# Patient Record
Sex: FEMALE | Race: WHITE | NOT HISPANIC OR LATINO | Employment: FULL TIME
[De-identification: names, ages, dates, MRNs, and addresses within clinical notes are randomized per-mention and may not be internally consistent; named-entity substitution may affect disease eponyms.]

---

## 2017-06-10 ENCOUNTER — HEALTH MAINTENANCE LETTER (OUTPATIENT)
Age: 62
End: 2017-06-10

## 2017-08-12 ENCOUNTER — HEALTH MAINTENANCE LETTER (OUTPATIENT)
Age: 62
End: 2017-08-12

## 2017-09-15 ENCOUNTER — TRANSFERRED RECORDS (OUTPATIENT)
Dept: HEALTH INFORMATION MANAGEMENT | Facility: CLINIC | Age: 62
End: 2017-09-15

## 2017-09-19 ENCOUNTER — OFFICE VISIT (OUTPATIENT)
Dept: INTERNAL MEDICINE | Facility: CLINIC | Age: 62
End: 2017-09-19
Payer: COMMERCIAL

## 2017-09-19 VITALS
SYSTOLIC BLOOD PRESSURE: 122 MMHG | WEIGHT: 247 LBS | DIASTOLIC BLOOD PRESSURE: 80 MMHG | BODY MASS INDEX: 39.7 KG/M2 | OXYGEN SATURATION: 98 % | HEIGHT: 66 IN | TEMPERATURE: 98.1 F | HEART RATE: 65 BPM

## 2017-09-19 DIAGNOSIS — E66.01 MORBID OBESITY, UNSPECIFIED OBESITY TYPE (H): ICD-10-CM

## 2017-09-19 DIAGNOSIS — Z12.39 SCREENING BREAST EXAMINATION: ICD-10-CM

## 2017-09-19 DIAGNOSIS — G47.33 OSA (OBSTRUCTIVE SLEEP APNEA): ICD-10-CM

## 2017-09-19 DIAGNOSIS — Z00.01 ENCOUNTER FOR ROUTINE ADULT MEDICAL EXAM WITH ABNORMAL FINDINGS: Primary | ICD-10-CM

## 2017-09-19 DIAGNOSIS — Z11.59 NEED FOR HEPATITIS C SCREENING TEST: ICD-10-CM

## 2017-09-19 DIAGNOSIS — E11.9 TYPE 2 DIABETES MELLITUS WITHOUT COMPLICATION, WITHOUT LONG-TERM CURRENT USE OF INSULIN (H): ICD-10-CM

## 2017-09-19 DIAGNOSIS — D12.6 ADENOMATOUS POLYP OF COLON, UNSPECIFIED PART OF COLON: ICD-10-CM

## 2017-09-19 DIAGNOSIS — Z23 NEED FOR PROPHYLACTIC VACCINATION AND INOCULATION AGAINST INFLUENZA: ICD-10-CM

## 2017-09-19 DIAGNOSIS — Z13.820 SCREENING FOR OSTEOPOROSIS: ICD-10-CM

## 2017-09-19 LAB
ALBUMIN SERPL-MCNC: 3.5 G/DL (ref 3.4–5)
ALP SERPL-CCNC: 70 U/L (ref 40–150)
ALT SERPL W P-5'-P-CCNC: 50 U/L (ref 0–50)
ANION GAP SERPL CALCULATED.3IONS-SCNC: 8 MMOL/L (ref 3–14)
AST SERPL W P-5'-P-CCNC: 48 U/L (ref 0–45)
BILIRUB SERPL-MCNC: 0.7 MG/DL (ref 0.2–1.3)
BUN SERPL-MCNC: 12 MG/DL (ref 7–30)
CALCIUM SERPL-MCNC: 9.3 MG/DL (ref 8.5–10.1)
CHLORIDE SERPL-SCNC: 102 MMOL/L (ref 94–109)
CHOLEST SERPL-MCNC: 140 MG/DL
CO2 SERPL-SCNC: 28 MMOL/L (ref 20–32)
CREAT SERPL-MCNC: 0.78 MG/DL (ref 0.52–1.04)
CREAT UR-MCNC: 254 MG/DL
GFR SERPL CREATININE-BSD FRML MDRD: 75 ML/MIN/1.7M2
GLUCOSE SERPL-MCNC: 133 MG/DL (ref 70–99)
HBA1C MFR BLD: 6.8 % (ref 4.3–6)
HCV AB SERPL QL IA: NONREACTIVE
HDLC SERPL-MCNC: 50 MG/DL
LDLC SERPL CALC-MCNC: 69 MG/DL
MICROALBUMIN UR-MCNC: 35 MG/L
MICROALBUMIN/CREAT UR: 13.94 MG/G CR (ref 0–25)
NONHDLC SERPL-MCNC: 90 MG/DL
POTASSIUM SERPL-SCNC: 3.7 MMOL/L (ref 3.4–5.3)
PROT SERPL-MCNC: 7.9 G/DL (ref 6.8–8.8)
SODIUM SERPL-SCNC: 138 MMOL/L (ref 133–144)
TRIGL SERPL-MCNC: 105 MG/DL
TSH SERPL DL<=0.005 MIU/L-ACNC: 2.46 MU/L (ref 0.4–4)

## 2017-09-19 PROCEDURE — 82043 UR ALBUMIN QUANTITATIVE: CPT | Performed by: INTERNAL MEDICINE

## 2017-09-19 PROCEDURE — 83036 HEMOGLOBIN GLYCOSYLATED A1C: CPT | Performed by: INTERNAL MEDICINE

## 2017-09-19 PROCEDURE — 84443 ASSAY THYROID STIM HORMONE: CPT | Performed by: INTERNAL MEDICINE

## 2017-09-19 PROCEDURE — 90686 IIV4 VACC NO PRSV 0.5 ML IM: CPT | Performed by: INTERNAL MEDICINE

## 2017-09-19 PROCEDURE — 99396 PREV VISIT EST AGE 40-64: CPT | Performed by: INTERNAL MEDICINE

## 2017-09-19 PROCEDURE — 36415 COLL VENOUS BLD VENIPUNCTURE: CPT | Performed by: INTERNAL MEDICINE

## 2017-09-19 PROCEDURE — 80061 LIPID PANEL: CPT | Performed by: INTERNAL MEDICINE

## 2017-09-19 PROCEDURE — 90471 IMMUNIZATION ADMIN: CPT | Performed by: INTERNAL MEDICINE

## 2017-09-19 PROCEDURE — 99207 C FOOT EXAM  NO CHARGE: CPT | Mod: 25 | Performed by: INTERNAL MEDICINE

## 2017-09-19 PROCEDURE — 80053 COMPREHEN METABOLIC PANEL: CPT | Performed by: INTERNAL MEDICINE

## 2017-09-19 PROCEDURE — 86803 HEPATITIS C AB TEST: CPT | Performed by: INTERNAL MEDICINE

## 2017-09-19 RX ORDER — PRAVASTATIN SODIUM 20 MG
20 TABLET ORAL
COMMUNITY
Start: 2016-08-24 | End: 2017-09-27

## 2017-09-19 RX ORDER — METFORMIN HCL 500 MG
1000 TABLET, EXTENDED RELEASE 24 HR ORAL
COMMUNITY
Start: 2016-08-24 | End: 2017-09-27

## 2017-09-19 ASSESSMENT — PATIENT HEALTH QUESTIONNAIRE - PHQ9: SUM OF ALL RESPONSES TO PHQ QUESTIONS 1-9: 3

## 2017-09-19 NOTE — NURSING NOTE
"Chief Complaint   Patient presents with     Physical       Initial /80  Pulse 65  Temp 98.1  F (36.7  C) (Oral)  Ht 5' 5.5\" (1.664 m)  Wt 247 lb (112 kg)  LMP 10/26/2006  SpO2 98%  BMI 40.48 kg/m2 Estimated body mass index is 40.48 kg/(m^2) as calculated from the following:    Height as of this encounter: 5' 5.5\" (1.664 m).    Weight as of this encounter: 247 lb (112 kg).  Medication Reconciliation: complete  "

## 2017-09-19 NOTE — MR AVS SNAPSHOT
After Visit Summary   9/19/2017    Kristen Moser    MRN: [id number]           Patient Information     Date Of Birth          1955        Visit Information        Provider Department      9/19/2017 8:00 AM Radames Reed [place] [facility]        Today's Diagnoses     Need for prophylactic vaccination and inoculation against influenza    -  1    Type 2 diabetes mellitus without complication, without long-term current use of insulin (H)        Adenomatous polyp of colon, unspecified part of colon        Screening breast examination        History of menopause        Need for hepatitis C screening test          Care Instructions    Continue current meds  Prescriptions refilled after labs back.    Labs today as ordered  Calorie/carbohydrate (sugar, bread, potato, pasta, etc) reduction in diet for weight loss.  Increase color on your plate with fruits and vegetables. Increase  frequency of walking or other aerobic exercise as able (goal is daily)  Bone density test  And Mammogram - [facility]  Vaccinations: Influenza  Colonoscopy  with  [facility]. They will call to schedule. If not heard from them in 1 week, then call [phone].   Pt to check with insurance re: coverage for Zostavax (Shingles vaccine) and  get through the pharmacy or clinic (depending on your insurance's preference) if you wish to receive it          Follow-ups after your visit        Additional Services     GASTROENTEROLOGY ADULT REF PROCEDURE ONLY                 Future tests that were ordered for you today     Open Future Orders        Priority Expected Expires Ordered    DX Hip/Pelvis/Spine Routine  9/19/2018 9/19/2017    *MA Screening Digital Bilateral Routine  9/19/2018 9/19/2017            Who to contact     If you have questions or need follow up information about today's clinic visit or your schedule please contact [facility] directly at [phone].  Normal or  "non-critical lab and imaging results will be communicated to you by MyChart, letter or phone within 4 business days after the clinic has received the results. If you do not hear from us within 7 days, please contact the clinic through [employer] or phone. If you have a critical or abnormal lab result, we will notify you by phone as soon as possible.  Submit refill requests through [employer] or call your pharmacy and they will forward the refill request to us. Please allow 3 business days for your refill to be completed.          Additional Information About Your Visit        [employer] Information     [employer] lets you send messages to your doctor, view your test results, renew your prescriptions, schedule appointments and more. To sign up, go to [email or web address] . Click on \"Log in\" on the left side of the screen, which will take you to the Welcome page. Then click on \"Sign up Now\" on the right side of the page.     You will be asked to enter the access code listed below, as well as some personal information. Please follow the directions to create your username and password.     Your access code is: [email or web address]  Expires: 2017  8:44 AM     Your access code will  in 90 days. If you need help or a new code, please call your [place] clinic or [phone].        Care EveryWhere ID     This is your Care EveryWhere ID. This could be used by other organizations to access your [place] medical records  [id number]        Your Vitals Were     Pulse Temperature Height Last Period Pulse Oximetry BMI (Body Mass Index)    65 98.1  F (36.7  C) (Oral) 5' 5.5\" (1.664 m) 10/26/2006 98% 40.48 kg/m2       Blood Pressure from Last 3 Encounters:   17 122/80   06/08/15 122/82   05/20/15 138/61    Weight from Last 3 Encounters:   17 247 lb (112 kg)   06/08/15 244 lb (110.7 kg)   05/20/15 240 lb (108.9 kg)              We Performed the Following     Albumin Random Urine Quantitative with Creat Ratio     " Comprehensive metabolic panel     EYE EXAM (SIMPLE-NONBILLABLE)     FLU VAC, SPLIT VIRUS IM > 3 YO (QUADRIVALENT) [81619]     FOOT EXAM     GASTROENTEROLOGY ADULT REF PROCEDURE ONLY     Hemoglobin A1c     Hepatitis C Screen Reflex to HCV RNA Quant and Genotype     Lipid panel reflex to direct LDL     TSH with free T4 reflex     Vaccine Administration, Initial [13814]        Primary Care Provider Office Phone # Fax #    Radames Reed [phone]       [address] [facility] [address]        Equal Access to Services     SANTOS MOREAU : Hadii aad ku hadasho Soomaali, waaxda luqadaha, qaybta kaalmada adeegyada, waxay idiin hayaan adeeg kharash lanahomy . So [facility] [phone].    ATENCIÓN: Si habla español, tiene a palumbo disposición servicios gratuitos de asistencia lingüística. Llame al [phone].    We comply with applicable federal civil rights laws and [place] laws. We do not discriminate on the basis of race, color, national origin, age, disability sex, sexual orientation or gender identity.            Thank you!     Thank you for choosing [facility]  for your care. Our goal is always to provide you with excellent care. Hearing back from our patients is one way we can continue to improve our services. Please take a few minutes to complete the written survey that you may receive in the mail after your visit with us. Thank you!             Your Updated Medication List - Protect others around you: Learn how to safely use, store and throw away your medicines at www.disposemymeds.org.          This list is accurate as of: 9/19/17  8:45 AM.  Always use your most recent med list.                   Brand Name Dispense Instructions for use Diagnosis    amLODIPine 5 MG tablet    NORVASC    90 tablet    Take 1 tablet (5 mg) by mouth daily    Essential hypertension, benign       aspirin 81 MG tablet     100    1 tab po QD (Once per day)        CALCIUM 600+D 600-200 MG-UNIT Tabs    Generic drug:  calcium carbonate-vitamin D      Take 1 tablet by mouth 2 times daily        ibuprofen 600 MG tablet    ADVIL/MOTRIN    30 tablet    Take 1 tablet by mouth every 6 hours as needed for pain.    Baker's cyst, ruptured       losartan-hydrochlorothiazide 100-25 MG per tablet    HYZAAR    90 tablet    Take 1 tablet by mouth daily    Essential hypertension, benign       metFORMIN 500 MG 24 hr tablet    GLUCOPHAGE-XR     Take 1,000 mg by mouth        pravastatin 20 MG tablet    PRAVACHOL     Take 20 mg by mouth        vitamin D 2000 UNITS tablet     100 tablet    Take 2,000 Units by mouth daily.    Vitamin D deficiency disease

## 2017-09-19 NOTE — PATIENT INSTRUCTIONS
Continue current meds  Prescriptions refilled after labs back.    Labs today as ordered  Calorie/carbohydrate (sugar, bread, potato, pasta, etc) reduction in diet for weight loss.  Increase color on your plate with fruits and vegetables. Increase  frequency of walking or other aerobic exercise as able (goal is daily)  Bone density test  And Mammogram - [place]  Vaccinations: Influenza  Colonoscopy  with  [facility]. They will call to schedule. If not heard from them in 1 week, then call [phone].   Pt to check with insurance re: coverage for Zostavax (Shingles vaccine) and  get through the pharmacy or clinic (depending on your insurance's preference) if you wish to receive it

## 2017-09-19 NOTE — PROGRESS NOTES
SUBJECTIVE:   CC: Kristen Moser is an 62 year old woman who presents for preventive health visit.     Healthy Habits:  Answers for HPI/ROS submitted by the patient on 9/19/2017   Annual Exam:  Getting at least 3 servings of Calcium per day:: NO  Bi-annual eye exam:: Yes  Dental care twice a year:: Yes  Sleep apnea or symptoms of sleep apnea:: Sleep apnea  Diet:: Regular (no restrictions), Vegetarian/vegan  Taking medications regularly:: Yes  Medication side effects:: None  Additional concerns today:: No  PHQ-2 Score: 0       Hx diabetes mellitus. Had labs done 1 year ago at [facility]. See Care Everywhere for results.         Today's PHQ-2 Score: PHQ-2 ( 1999 Pfizer) 9/19/2017 6/8/2015   Q1: Little interest or pleasure in doing things 0 0   Q2: Feeling down, depressed or hopeless 0 0   PHQ-2 Score 0 0   Q1: Little interest or pleasure in doing things Not at all -   Q2: Feeling down, depressed or hopeless Not at all -   PHQ-2 Score 0 -         Abuse: Current or Past(Physical, Sexual or Emotional)- No  Do you feel safe in your environment - Yes  Social History   Substance Use Topics     Smoking status: Never Smoker     Smokeless tobacco: Never Used     Alcohol use No      Comment: 1 drink / month     The patient does not drink >3 drinks per day nor >7 drinks per week.    Reviewed orders with patient.  Reviewed health maintenance and updated orders accordingly - Yes  Labs reviewed in [facility]    Patient over age 50, mutual decision to screen reflected in health maintenance.      Pertinent mammograms are reviewed under the imaging tab.  History of abnormal Pap smear: NO - age 30-65 PAP every 5 years with negative HPV co-testing recommended    Reviewed and updated as needed this visit by clinical staffAllergies         Reviewed and updated as needed this visit by Provider            ROS:  C: NEGATIVE for fever, chills Weight up 3 pounds in 2 years  I: NEGATIVE for worrisome rashes, moles or lesions  E: NEGATIVE for  "vision changes or irritation. Had eye exam last week  ENT: NEGATIVE for ear, mouth and throat problems  R: NEGATIVE for significant cough or SOB  B: NEGATIVE for masses, tenderness or discharge  CV: NEGATIVE for chest pain, palpitations or peripheral edema  GI: NEGATIVE for nausea, abdominal pain, heartburn, or change in bowel habits  : NEGATIVE for unusual urinary or vaginal symptoms. No vaginal bleeding. Pap UTD  M: NEGATIVE for significant arthralgias or myalgia  N: NEGATIVE for weakness, dizziness or paresthesias  P: NEGATIVE for changes in mood or affect. Now off of Prozac for > 1 year. PHQ=3  E: POSITIVE for diabetes mellitus. Not checking sugars due for A1C and other labs. Last done Aug 2016    OBJECTIVE:   /80  Pulse 65  Temp 98.1  F (36.7  C) (Oral)  Ht 5' 5.5\" (1.664 m)  Wt 247 lb (112 kg)  LMP 10/26/2006  SpO2 98%  BMI 40.48 kg/m2  EXAM:  General appearance - healthy, alert, no distress  Skin - No rashes or lesions.  Head - normocephalic, atraumatic  Eyes - TAMIR, EOMI, fundi exam with nondilated pupils negative.  Ears - External ears normal. Canals clear. TM's normal.  Nose/Sinuses - Nares normal. Septum midline. Mucosa normal. No drainage or sinus tenderness.  Oropharynx - No erythema, no adenopathy, no exudates. Narrowed airway due to obesity  Neck - Supple without adenopathy or thyromegaly. No bruits.  Lungs - Clear to auscultation without wheezes/rhonchi.  Heart - Regular rate and rhythm without murmurs, clicks, or gallops.  Nodes - No supraclavicular, axillary, or inguinal adenopathy palpable.  Breasts - deferred  Abdomen - Abdomen obese, soft, non-tender. BS normal. No masses or hepatosplenomegaly palpable. No bruits.  Extremities -No cyanosis, clubbing or edema.    Musculoskeletal - Spine ROM normal. Muscular strength intact.   Peripheral pulses - radial=4/4, femoral=4/4, posterior tibial=4/4, dorsalis pedis=4/4,  Neuro - Gait normal. Reflexes normal and symmetric. Sensation grossly " WNL.  Genital/Rectal - deferred      ASSESSMENT/PLAN:   1. Encounter for routine adult medical exam with abnormal findings  See below for HCM.  Pap UTD  - Comprehensive metabolic panel    2. Type 2 diabetes mellitus without complication, without long-term current use of insulin (H)  Previous well controlled. Not checking sugars currently. Labs as ordered  - EYE EXAM (SIMPLE-NONBILLABLE)  - Comprehensive metabolic panel  - Lipid panel reflex to direct LDL  - Albumin Random Urine Quantitative with Creat Ratio  - TSH with free T4 reflex  - Hemoglobin A1c  - FOOT EXAM    3. Morbid obesity, unspecified obesity type (H)  See plan discussion below re: counselling    4. Adenomatous polyp of colon, unspecified part of colon  Overdue for colonoscopy with hx polyp  - GASTROENTEROLOGY ADULT REF PROCEDURE ONLY    5. DIXIE (obstructive sleep apnea)  Pt declines CPAp at this time. Energy moderate. Pt will inform MD if willing to start CPAP therapy and will then refer to sleep clinic again    6. Screening breast examination  - *MA Screening Digital Bilateral; Future    7. Need for prophylactic vaccination and inoculation against influenza  - FLU VAC, SPLIT VIRUS IM > 3 YO (QUADRIVALENT) [80732]  - Vaccine Administration, Initial [12250]    8. Need for hepatitis C screening test  Pt meets criteria for hepatitis C screening based on birth year 1946-65. Discussed pro/cons of Hep C screening/treatment and recs of USPTF. Pt agreeable to screening  - Hepatitis C Screen Reflex to HCV RNA Quant and Genotype    9. Screening for osteoporosis  Due for DEXA  - DX Hip/Pelvis/Spine; Future      COUNSELING:   Reviewed preventive health counseling, as reflected in patient instructions  Special attention given to:        Regular exercise       Healthy diet/nutrition         reports that she has never smoked. She has never used smokeless tobacco.    Estimated body mass index is 40.6 kg/(m^2) as calculated from the following:    Height as of 5/20/15:  "5' 5\" (1.651 m).    Weight as of 6/8/15: 244 lb (110.7 kg).   Weight management plan: Discussed healthy diet and exercise guidelines and patient will follow up in 6 months in clinic to re-evaluate.    Counseling Resources:  ATP IV Guidelines  Pooled Cohorts Equation Calculator  Breast Cancer Risk Calculator  FRAX Risk Assessment  ICSI Preventive Guidelines  Dietary Guidelines for Americans, 2010  USDA's MyPlate  ASA Prophylaxis  Lung CA Screening      PLAN:  Continue current meds  Prescriptions refilled after labs back.    Labs today as ordered  Calorie/carbohydrate (sugar, bread, potato, pasta, etc) reduction in diet for weight loss.  Increase color on your plate with fruits and vegetables. Increase  frequency of walking or other aerobic exercise as able (goal is daily)  Bone density test  And [facility] - [facility]  Vaccinations: Influenza  Colonoscopy  with  [facility]. They will call to schedule. If not heard from them in 1 week, then call [phone].   Pt to check with insurance re: coverage for Zostavax (Shingles vaccine) and  get through the pharmacy or clinic (depending on your insurance's preference) if you wish to receive it         Radames Reed MD  [facility]      Injectable Influenza Immunization Documentation    1.  Is the person to be vaccinated sick today? NO    2. Does the person to be vaccinated have an allergy to a component   of the vaccine? NO    3. Has the person to be vaccinated ever had a serious reaction   to influenza vaccine in the past? NO    4. Has the person to be vaccinated ever had Guillain-Barré syndrome? NO    Form completed by Susanna Tinsley CMA           "

## 2017-09-19 NOTE — LETTER
[facility]  [address] [address]  [phone]      9/27/2017       Kristen Moser  [address] [facility] [address]        Dear Kristen,  Here are your most recent lab results. Unless commented on below, mild variation of results  outside the normal range are not clinically signicant.    Resulted Orders   Comprehensive metabolic panel   Result Value Ref Range    Sodium 138 133 - 144 mmol/L    Potassium 3.7 3.4 - 5.3 mmol/L    Chloride 102 94 - 109 mmol/L    Carbon Dioxide 28 20 - 32 mmol/L    Anion Gap 8 3 - 14 mmol/L    Glucose 133 (H) 70 - 99 mg/dL    Urea Nitrogen 12 7 - 30 mg/dL    Creatinine 0.78 0.52 - 1.04 mg/dL    GFR Estimate 75 >60 mL/min/1.7m2      Comment:      Non  GFR Calc    GFR Estimate If Black >90 >60 mL/min/1.7m2      Comment:       GFR Calc    Calcium 9.3 8.5 - 10.1 mg/dL    Bilirubin Total 0.7 0.2 - 1.3 mg/dL    Albumin 3.5 3.4 - 5.0 g/dL    Protein Total 7.9 6.8 - 8.8 g/dL    Alkaline Phosphatase 70 40 - 150 U/L    ALT 50 0 - 50 U/L    AST 48 (H) 0 - 45 U/L   Lipid panel reflex to direct LDL   Result Value Ref Range    Cholesterol 140 <200 mg/dL    Triglycerides 105 <150 mg/dL    HDL Cholesterol 50 >49 mg/dL    LDL Cholesterol Calculated 69 <100 mg/dL      Comment:      Desirable:       <100 mg/dl    Non HDL Cholesterol 90 <130 mg/dL   Albumin Random Urine Quantitative with Creat Ratio   Result Value Ref Range    Creatinine Urine 254 mg/dL    Albumin Urine mg/L 35 mg/L    Albumin Urine mg/g Cr 13.94 0 - 25 mg/g Cr   TSH with free T4 reflex   Result Value Ref Range    TSH 2.46 0.40 - 4.00 mU/L   Hemoglobin A1c   Result Value Ref Range    Hemoglobin A1C 6.8 (H) 4.3 - 6.0 %   Hepatitis C Screen Reflex to HCV RNA Quant and Genotype   Result Value Ref Range    Hepatitis C Antibody Nonreactive NR^Nonreactive      Comment:      Assay performance characteristics have not been established for newborns,  "  infants, and children         Total Cholesterol, HDL (good) Cholesterol, LDL (bad) Cholesterol, Non-HDL (bad) cholesterol, Triglycerides, Electrolyte, Glucose/Sugar with history of diabetes, Kidney function, ALT Liver, Thyroid and Urine Protein lab results were normal.  AST Liver lab results were minimally abnormal.  A1C lab result (which assesses your average blood sugar control for the past 3 months) was stable and overall showed good control of your diabetes.  No evidence of hepatitis C infection.  Continue current medication. Medications have been refilled at your pharmacy  Call our appointment desk at [phone] to schedule a \"lab only\" to have your A1C and Comprehensive Metabolic Panel checked non-fasting in 6 months.    If you have further questions/concerns regarding the results, I would ask that you bring them to your next follow-up appointment with me and I would be happy to review them with you further.      Sincerely,      Radames Reed MD  Internal Medicine      "

## 2017-09-19 NOTE — Clinical Note
Please abstract the following data from this visit with this patient into the appropriate field in Epic:  Colonoscopy done on this date: 01/16/2018 (approximately), by this group: [facility], results were Positive for Polyps.  Return in 5 years

## 2017-09-23 ENCOUNTER — HEALTH MAINTENANCE LETTER (OUTPATIENT)
Age: 62
End: 2017-09-23

## 2017-09-27 ENCOUNTER — TELEPHONE (OUTPATIENT)
Dept: INTERNAL MEDICINE | Facility: CLINIC | Age: 62
End: 2017-09-27

## 2017-09-27 DIAGNOSIS — E78.5 HYPERLIPIDEMIA WITH TARGET LDL LESS THAN 100: ICD-10-CM

## 2017-09-27 DIAGNOSIS — E11.9 TYPE 2 DIABETES MELLITUS WITHOUT COMPLICATION, WITHOUT LONG-TERM CURRENT USE OF INSULIN (H): Primary | ICD-10-CM

## 2017-09-27 DIAGNOSIS — I10 ESSENTIAL HYPERTENSION, BENIGN: ICD-10-CM

## 2017-09-27 RX ORDER — AMLODIPINE BESYLATE 5 MG/1
5 TABLET ORAL DAILY
Qty: 90 TABLET | Refills: 3 | Status: SHIPPED | OUTPATIENT
Start: 2017-09-27 | End: 2018-04-18

## 2017-09-27 RX ORDER — PRAVASTATIN SODIUM 20 MG
20 TABLET ORAL DAILY
Qty: 90 TABLET | Refills: 3 | Status: SHIPPED | OUTPATIENT
Start: 2017-09-27 | End: 2018-04-18

## 2017-09-27 RX ORDER — LOSARTAN POTASSIUM AND HYDROCHLOROTHIAZIDE 25; 100 MG/1; MG/1
1 TABLET ORAL DAILY
Qty: 90 TABLET | Refills: 3 | Status: SHIPPED | OUTPATIENT
Start: 2017-09-27 | End: 2018-04-18

## 2017-09-27 RX ORDER — METFORMIN HCL 500 MG
1000 TABLET, EXTENDED RELEASE 24 HR ORAL DAILY
Qty: 180 TABLET | Refills: 3 | Status: SHIPPED | OUTPATIENT
Start: 2017-09-27 | End: 2018-04-18

## 2017-09-27 NOTE — TELEPHONE ENCOUNTER
Patient stated that from last visit you wanted to wait until her labs were back to send over her refill. She says she only has one pill remaining and needs rx sent over asap. Please call patient @ mobile # listed on file when rx is approved.

## 2017-09-27 NOTE — TELEPHONE ENCOUNTER
Rxs refilled. Tried to call pt at work number but not able to reach her. LM with home number that RFs done. WIll have letter sent to pt with labs

## 2018-01-15 ENCOUNTER — TRANSFERRED RECORDS (OUTPATIENT)
Dept: HEALTH INFORMATION MANAGEMENT | Facility: CLINIC | Age: 63
End: 2018-01-15

## 2018-01-16 ENCOUNTER — EXTERNAL ORDER RESULTS (OUTPATIENT)
Dept: HEALTH INFORMATION MANAGEMENT | Facility: CLINIC | Age: 63
End: 2018-01-16

## 2018-01-24 PROBLEM — K63.5 COLON POLYP: Status: ACTIVE | Noted: 2018-01-24

## 2018-04-18 DIAGNOSIS — E11.9 TYPE 2 DIABETES MELLITUS WITHOUT COMPLICATION, WITHOUT LONG-TERM CURRENT USE OF INSULIN (H): ICD-10-CM

## 2018-04-18 DIAGNOSIS — I10 ESSENTIAL HYPERTENSION, BENIGN: ICD-10-CM

## 2018-04-18 DIAGNOSIS — E55.9 VITAMIN D DEFICIENCY DISEASE: ICD-10-CM

## 2018-04-18 DIAGNOSIS — E78.5 HYPERLIPIDEMIA WITH TARGET LDL LESS THAN 100: ICD-10-CM

## 2018-04-18 RX ORDER — METFORMIN HCL 500 MG
1000 TABLET, EXTENDED RELEASE 24 HR ORAL DAILY
Qty: 60 TABLET | Refills: 0 | Status: SHIPPED | OUTPATIENT
Start: 2018-04-18 | End: 2018-05-17

## 2018-04-18 RX ORDER — LOSARTAN POTASSIUM AND HYDROCHLOROTHIAZIDE 25; 100 MG/1; MG/1
1 TABLET ORAL DAILY
Qty: 90 TABLET | Refills: 3 | Status: CANCELLED | OUTPATIENT
Start: 2018-04-18

## 2018-04-18 RX ORDER — LOSARTAN POTASSIUM AND HYDROCHLOROTHIAZIDE 25; 100 MG/1; MG/1
1 TABLET ORAL DAILY
Qty: 90 TABLET | Refills: 1 | Status: SHIPPED | OUTPATIENT
Start: 2018-04-18 | End: 2018-11-01

## 2018-04-18 RX ORDER — AMLODIPINE BESYLATE 5 MG/1
5 TABLET ORAL DAILY
Qty: 90 TABLET | Refills: 1 | Status: SHIPPED | OUTPATIENT
Start: 2018-04-18 | End: 2018-11-05

## 2018-04-18 RX ORDER — PRAVASTATIN SODIUM 20 MG
20 TABLET ORAL DAILY
Qty: 90 TABLET | Refills: 1 | Status: SHIPPED | OUTPATIENT
Start: 2018-04-18 | End: 2018-10-05

## 2018-04-18 RX ORDER — CHOLECALCIFEROL (VITAMIN D3) 50 MCG
2000 TABLET ORAL DAILY
Qty: 100 TABLET | Refills: 3 | Status: CANCELLED | OUTPATIENT
Start: 2018-04-18

## 2018-04-18 NOTE — LETTER
[facility]  [address] [id number] [phone]            Kristen Moser  [address] [facility] [address]        April 18, 2018    Dear Kristen,    While refilling your prescription today, we noticed that you are due to have labs drawn and see your provider.  We will refill your prescription for 30 days, but a follow-up appointment must be made before any additional refills can be approved.     Taking care of your health is important to us and we look forward to seeing you in the near future.  Please call us at [phone] or [phone] (or use [employer]) to schedule an appointment.     Please disregard this notice if you have already made an appointment.    Sincerely,        [facility]

## 2018-04-18 NOTE — TELEPHONE ENCOUNTER
"Requested Prescriptions   Pending Prescriptions Disp Refills     pravastatin (PRAVACHOL) 20 MG tablet  Last Written Prescription Date:  9/27/17  Last Fill Quantity: 90,  # refills: 3   Last office visit: 9/19/2017 with prescribing provider:  9/19/17   Future Office Visit:     90 tablet 3     Sig: Take 1 tablet (20 mg) by mouth daily    Statins Protocol Passed    4/18/2018  1:03 PM       Passed - LDL on file in past 12 months    Recent Labs   Lab Test  09/19/17   0846   LDL  69            Passed - No abnormal creatine kinase in past 12 months    Recent Labs   Lab Test  02/22/12   0729   CKT  37               Passed - Recent (12 mo) or future (30 days) visit within the authorizing provider's specialty    Patient had office visit in the last 12 months or has a visit in the next 30 days with authorizing provider or within the authorizing provider's specialty.  See \"Patient Info\" tab in inbasket, or \"Choose Columns\" in Meds & Orders section of the refill encounter.           Passed - Patient is age 18 or older       Passed - No active pregnancy on record       Passed - No positive pregnancy test in past 12 months        Cholecalciferol (VITAMIN D) 2000 units tablet 100 tablet 3     Sig: Take 2,000 Units by mouth daily    Vitamin Supplements (Adult) Protocol Passed    4/18/2018  1:03 PM       Passed - High dose Vitamin D not ordered       Passed - Recent (12 mo) or future (30 days) visit within the authorizing provider's specialty    Patient had office visit in the last 12 months or has a visit in the next 30 days with authorizing provider or within the authorizing provider's specialty.  See \"Patient Info\" tab in inbasket, or \"Choose Columns\" in Meds & Orders section of the refill encounter.            amLODIPine (NORVASC) 5 MG tablet  Last Written Prescription Date:  9/27/17  Last Fill Quantity: 90,  # refills: 3   Last office visit: 9/19/2017 with prescribing provider:  9/19/17   Future Office Visit:     90 tablet 3     " "Sig: Take 1 tablet (5 mg) by mouth daily    Calcium Channel Blockers Protocol  Passed    4/18/2018  1:03 PM       Passed - Blood pressure under 140/90 in past 12 months    BP Readings from Last 3 Encounters:   09/19/17 122/80   06/08/15 122/82   05/20/15 138/61                Passed - Recent (12 mo) or future (30 days) visit within the authorizing provider's specialty    Patient had office visit in the last 12 months or has a visit in the next 30 days with authorizing provider or within the authorizing provider's specialty.  See \"Patient Info\" tab in inbasket, or \"Choose Columns\" in Meds & Orders section of the refill encounter.           Passed - Patient is age 18 or older       Passed - No active pregnancy on record       Passed - Normal serum creatinine on file in past 12 months    Recent Labs   Lab Test  09/19/17   0846   CR  0.78            Passed - No positive pregnancy test in past 12 months        metFORMIN (GLUCOPHAGE-XR) 500 MG 24 hr tablet  Last Written Prescription Date:  9/27/17  Last Fill Quantity: 180,  # refills: 3   Last office visit: 9/19/2017 with prescribing provider:  9/19/17   Future Office Visit:     180 tablet 3     Sig: Take 2 tablets (1,000 mg) by mouth daily    Biguanide Agents Failed    4/18/2018  1:03 PM       Failed - Blood pressure less than 140/90 in past 6 months    BP Readings from Last 3 Encounters:   09/19/17 122/80   06/08/15 122/82   05/20/15 138/61                Failed - Patient has documented A1c within the specified period of time.    Recent Labs   Lab Test  09/19/17   0846   A1C  6.8*            Failed - Recent (6 mo) or future (30 days) visit within the authorizing provider's specialty    Patient had office visit in the last 6 months or has a visit in the next 30 days with authorizing provider or within the authorizing provider's specialty.  See \"Patient Info\" tab in inbasket, or \"Choose Columns\" in Meds & Orders section of the refill encounter.           Passed - Patient " has documented LDL within the past 12 mos.    Recent Labs   Lab Test  09/19/17   0846   LDL  69            Passed - Patient has had a Microalbumin in the past 12 mos.    Recent Labs   Lab Test  09/19/17   0846   MICROL  35   UMALCR  13.94            Passed - Patient is age 10 or older       Passed - Patient's CR is NOT>1.4 OR Patient's EGFR is NOT<45 within past 12 mos.    Recent Labs   Lab Test  09/19/17   0846   GFRESTIMATED  75   GFRESTBLACK  >90       Recent Labs   Lab Test  09/19/17   0846   CR  0.78            Passed - Patient does NOT have a diagnosis of CHF.       Passed - Patient is not pregnant       Passed - Patient has not had a positive pregnancy test within the past 12 mos.

## 2018-04-18 NOTE — TELEPHONE ENCOUNTER
"Requested Prescriptions   Pending Prescriptions Disp Refills     losartan-hydrochlorothiazide (HYZAAR) 100-25 MG per tablet  Last Written Prescription Date:  9/27/17  Last Fill Quantity: 90,  # refills: 3   Last office visit: 9/19/2017 with prescribing provider:  9/19/17   Future Office Visit:     90 tablet 3     Sig: Take 1 tablet by mouth daily    Angiotensin-II Receptors Passed    4/18/2018  1:38 PM       Passed - Blood pressure under 140/90 in past 12 months    BP Readings from Last 3 Encounters:   09/19/17 122/80   06/08/15 122/82   05/20/15 138/61                Passed - Recent (12 mo) or future (30 days) visit within the authorizing provider's specialty    Patient had office visit in the last 12 months or has a visit in the next 30 days with authorizing provider or within the authorizing provider's specialty.  See \"Patient Info\" tab in inbasket, or \"Choose Columns\" in Meds & Orders section of the refill encounter.           Passed - Patient is age 18 or older       Passed - No active pregnancy on record       Passed - Normal serum creatinine on file in past 12 months    Recent Labs   Lab Test  09/19/17   0846   CR  0.78            Passed - Normal serum potassium on file in past 12 months    Recent Labs   Lab Test  09/19/17   0846   POTASSIUM  3.7                   Passed - No positive pregnancy test in past 12 months          "

## 2018-04-26 ENCOUNTER — RADIANT APPOINTMENT (OUTPATIENT)
Dept: MAMMOGRAPHY | Facility: CLINIC | Age: 63
End: 2018-04-26
Attending: INTERNAL MEDICINE
Payer: COMMERCIAL

## 2018-04-26 DIAGNOSIS — Z12.39 SCREENING BREAST EXAMINATION: ICD-10-CM

## 2018-04-26 DIAGNOSIS — Z12.31 VISIT FOR SCREENING MAMMOGRAM: ICD-10-CM

## 2018-04-26 DIAGNOSIS — E11.9 TYPE 2 DIABETES MELLITUS WITHOUT COMPLICATION, WITHOUT LONG-TERM CURRENT USE OF INSULIN (H): ICD-10-CM

## 2018-04-26 LAB
ALBUMIN SERPL-MCNC: 3.6 G/DL (ref 3.4–5)
ALP SERPL-CCNC: 79 U/L (ref 40–150)
ALT SERPL W P-5'-P-CCNC: 38 U/L (ref 0–50)
ANION GAP SERPL CALCULATED.3IONS-SCNC: 8 MMOL/L (ref 3–14)
AST SERPL W P-5'-P-CCNC: 33 U/L (ref 0–45)
BILIRUB SERPL-MCNC: 0.6 MG/DL (ref 0.2–1.3)
BUN SERPL-MCNC: 20 MG/DL (ref 7–30)
CALCIUM SERPL-MCNC: 9.9 MG/DL (ref 8.5–10.1)
CHLORIDE SERPL-SCNC: 103 MMOL/L (ref 94–109)
CO2 SERPL-SCNC: 30 MMOL/L (ref 20–32)
CREAT SERPL-MCNC: 0.7 MG/DL (ref 0.52–1.04)
GFR SERPL CREATININE-BSD FRML MDRD: 85 ML/MIN/1.7M2
GLUCOSE SERPL-MCNC: 167 MG/DL (ref 70–99)
HBA1C MFR BLD: 6.9 % (ref 0–5.6)
POTASSIUM SERPL-SCNC: 4.1 MMOL/L (ref 3.4–5.3)
PROT SERPL-MCNC: 8.3 G/DL (ref 6.8–8.8)
SODIUM SERPL-SCNC: 141 MMOL/L (ref 133–144)

## 2018-04-26 PROCEDURE — 80053 COMPREHEN METABOLIC PANEL: CPT | Performed by: INTERNAL MEDICINE

## 2018-04-26 PROCEDURE — 77067 SCR MAMMO BI INCL CAD: CPT | Mod: TC

## 2018-04-26 PROCEDURE — 36415 COLL VENOUS BLD VENIPUNCTURE: CPT | Performed by: INTERNAL MEDICINE

## 2018-04-26 PROCEDURE — 83036 HEMOGLOBIN GLYCOSYLATED A1C: CPT | Performed by: INTERNAL MEDICINE

## 2018-05-17 DIAGNOSIS — E11.9 TYPE 2 DIABETES MELLITUS WITHOUT COMPLICATION, WITHOUT LONG-TERM CURRENT USE OF INSULIN (H): ICD-10-CM

## 2018-05-17 NOTE — TELEPHONE ENCOUNTER
"Requested Prescriptions   Pending Prescriptions Disp Refills     metFORMIN (GLUCOPHAGE-XR) 500 MG 24 hr tablet [Pharmacy Med Name: METFORMIN  MG TABLET] 60 tablet 0     Sig: TAKE 2 TABLETS (1,000 MG) BY MOUTH DAILY    Biguanide Agents Failed    5/17/2018  5:21 AM       Failed - Blood pressure less than 140/90 in past 6 months    BP Readings from Last 3 Encounters:   09/19/17 122/80   06/08/15 122/82   05/20/15 138/61                Failed - Recent (6 mo) or future (30 days) visit within the authorizing provider's specialty    Patient had office visit in the last 6 months or has a visit in the next 30 days with authorizing provider or within the authorizing provider's specialty.  See \"Patient Info\" tab in inbasket, or \"Choose Columns\" in Meds & Orders section of the refill encounter.           Passed - Patient has documented LDL within the past 12 mos.    Recent Labs   Lab Test  09/19/17   0846   LDL  69            Passed - Patient has had a Microalbumin in the past 12 mos.    Recent Labs   Lab Test  09/19/17   0846   MICROL  35   UMALCR  13.94            Passed - Patient is age 10 or older       Passed - Patient has documented A1c within the specified period of time.    If HgbA1C is 8 or greater, it needs to be on file within the past 3 months.  If less than 8, must be on file within the past 6 months.     Recent Labs   Lab Test  04/26/18   0753   A1C  6.9*            Passed - Patient's CR is NOT>1.4 OR Patient's EGFR is NOT<45 within past 12 mos.    Recent Labs   Lab Test  04/26/18   0753   GFRESTIMATED  85   GFRESTBLACK  >90       Recent Labs   Lab Test  04/26/18   0753   CR  0.70            Passed - Patient does NOT have a diagnosis of CHF.       Passed - Patient is not pregnant       Passed - Patient has not had a positive pregnancy test within the past 12 mos.         Routing refill request to provider for review/approval because:  Dipti given x1 and patient did not follow up, please advise  Overdue for " office visit

## 2018-05-18 RX ORDER — METFORMIN HCL 500 MG
TABLET, EXTENDED RELEASE 24 HR ORAL
Qty: 180 TABLET | Refills: 1 | Status: SHIPPED | OUTPATIENT
Start: 2018-05-18 | End: 2018-11-05

## 2018-10-05 DIAGNOSIS — E78.5 HYPERLIPIDEMIA WITH TARGET LDL LESS THAN 100: ICD-10-CM

## 2018-10-05 RX ORDER — PRAVASTATIN SODIUM 20 MG
TABLET ORAL
Qty: 30 TABLET | Refills: 0 | Status: SHIPPED | OUTPATIENT
Start: 2018-10-05 | End: 2018-11-05

## 2018-10-05 NOTE — TELEPHONE ENCOUNTER
Medication is being filled for 1 time refill only due to:  Patient needs labs FLP. Patient needs to be seen because it has been more than one year since last visit.  Letter sent.

## 2018-10-05 NOTE — TELEPHONE ENCOUNTER
"Requested Prescriptions   Pending Prescriptions Disp Refills     pravastatin (PRAVACHOL) 20 MG tablet [Pharmacy Med Name: PRAVASTATIN SODIUM 20 MG TAB] 90 tablet 0    Last Written Prescription Date:  4/18/2018  Last Fill Quantity: 90,  # refills: 1   Last office visit: 9/19/2017 with prescribing provider:  9/19/2017   Future Office Visit:     Sig: TAKE 1 TABLET (20 MG) BY MOUTH DAILY    Statins Protocol Failed    10/5/2018  2:06 AM       Failed - LDL on file in past 12 months    Recent Labs   Lab Test  09/19/17   0846   LDL  69            Failed - Recent (12 mo) or future (30 days) visit within the authorizing provider's specialty    Patient had office visit in the last 12 months or has a visit in the next 30 days with authorizing provider or within the authorizing provider's specialty.  See \"Patient Info\" tab in inbasket, or \"Choose Columns\" in Meds & Orders section of the refill encounter.           Passed - No abnormal creatine kinase in past 12 months    Recent Labs   Lab Test  02/22/12   0729   CKT  37               Passed - Patient is age 18 or older       Passed - No active pregnancy on record       Passed - No positive pregnancy test in past 12 months          "

## 2018-10-05 NOTE — LETTER
[facility]  [address] [id number] [phone]            Kristen Moser  [address] [place] [address]        October 5, 2018    Dear Kristen,    While refilling your prescription today, we noticed that you are due to have labs drawn and see your provider.  We will refill your prescription for 30 days, but a follow-up appointment must be made before any additional refills can be approved.     Taking care of your health is important to us and we look forward to seeing you in the near future.  Please call us at [phone] or [phone] (or use [email or web address]) to schedule an appointment.     Please disregard this notice if you have already made an appointment.    Sincerely,        [facility]

## 2018-10-14 ENCOUNTER — TELEPHONE (OUTPATIENT)
Dept: INTERNAL MEDICINE | Facility: CLINIC | Age: 63
End: 2018-10-14

## 2018-10-14 DIAGNOSIS — E11.9 TYPE 2 DIABETES MELLITUS WITHOUT COMPLICATION, WITHOUT LONG-TERM CURRENT USE OF INSULIN (H): ICD-10-CM

## 2018-10-14 DIAGNOSIS — E78.5 HYPERLIPIDEMIA WITH TARGET LDL LESS THAN 100: Primary | ICD-10-CM

## 2018-10-15 NOTE — TELEPHONE ENCOUNTER
Call pt. Has appt with me 11/5/18. Pt to have a fasting blood and urine lab between now and that appt so can review results when I see pt in clinic.  Labs ordered. ASsist pt with getting lab appt scheduled

## 2018-10-16 NOTE — TELEPHONE ENCOUNTER
Left message for pt to return phone call. Please see/inform message below.    Lizzy Melchor, CMA

## 2018-10-25 DIAGNOSIS — E11.9 TYPE 2 DIABETES MELLITUS WITHOUT COMPLICATION, WITHOUT LONG-TERM CURRENT USE OF INSULIN (H): ICD-10-CM

## 2018-10-25 LAB
ALBUMIN SERPL-MCNC: 3.4 G/DL (ref 3.4–5)
ALP SERPL-CCNC: 68 U/L (ref 40–150)
ALT SERPL W P-5'-P-CCNC: 42 U/L (ref 0–50)
ANION GAP SERPL CALCULATED.3IONS-SCNC: 8 MMOL/L (ref 3–14)
AST SERPL W P-5'-P-CCNC: 38 U/L (ref 0–45)
BILIRUB SERPL-MCNC: 0.7 MG/DL (ref 0.2–1.3)
BUN SERPL-MCNC: 15 MG/DL (ref 7–30)
CALCIUM SERPL-MCNC: 9.4 MG/DL (ref 8.5–10.1)
CHLORIDE SERPL-SCNC: 100 MMOL/L (ref 94–109)
CHOLEST SERPL-MCNC: 127 MG/DL
CO2 SERPL-SCNC: 30 MMOL/L (ref 20–32)
CREAT SERPL-MCNC: 0.81 MG/DL (ref 0.52–1.04)
GFR SERPL CREATININE-BSD FRML MDRD: 71 ML/MIN/1.7M2
GLUCOSE SERPL-MCNC: 128 MG/DL (ref 70–99)
HBA1C MFR BLD: 6.9 % (ref 0–5.6)
HDLC SERPL-MCNC: 51 MG/DL
LDLC SERPL CALC-MCNC: 50 MG/DL
NONHDLC SERPL-MCNC: 76 MG/DL
POTASSIUM SERPL-SCNC: 3.6 MMOL/L (ref 3.4–5.3)
PROT SERPL-MCNC: 8.2 G/DL (ref 6.8–8.8)
SODIUM SERPL-SCNC: 138 MMOL/L (ref 133–144)
TRIGL SERPL-MCNC: 132 MG/DL

## 2018-10-25 PROCEDURE — 36415 COLL VENOUS BLD VENIPUNCTURE: CPT | Performed by: INTERNAL MEDICINE

## 2018-10-25 PROCEDURE — 83036 HEMOGLOBIN GLYCOSYLATED A1C: CPT | Performed by: INTERNAL MEDICINE

## 2018-10-25 PROCEDURE — 80061 LIPID PANEL: CPT | Performed by: INTERNAL MEDICINE

## 2018-10-25 PROCEDURE — 80053 COMPREHEN METABOLIC PANEL: CPT | Performed by: INTERNAL MEDICINE

## 2018-11-01 DIAGNOSIS — I10 ESSENTIAL HYPERTENSION, BENIGN: ICD-10-CM

## 2018-11-01 RX ORDER — LOSARTAN POTASSIUM AND HYDROCHLOROTHIAZIDE 25; 100 MG/1; MG/1
TABLET ORAL
Qty: 30 TABLET | Refills: 0 | Status: SHIPPED | OUTPATIENT
Start: 2018-11-01 | End: 2018-11-05

## 2018-11-01 NOTE — TELEPHONE ENCOUNTER
"Requested Prescriptions   Pending Prescriptions Disp Refills     losartan-hydrochlorothiazide (HYZAAR) 100-25 MG per tablet [Pharmacy Med Name: LOSARTAN-HCTZ 100-25 MG TAB]  Last Written Prescription Date:  04/18/2018  Last Fill Quantity: 90,  # refills: 01   Last Office Visit: 9/19/2017   Future Office Visit:    Next 5 appointments (look out 90 days)     Nov 05, 2018  7:00 AM CST   [facility]    [address] [address] [phone]                  90 tablet 1     Sig: TAKE 1 TABLET BY MOUTH DAILY    Angiotensin-II Receptors Failed    11/1/2018  2:36 AM       Failed - Blood pressure under 140/90 in past 12 months    BP Readings from Last 3 Encounters:   09/19/17 122/80   06/08/15 122/82   05/20/15 138/61                Passed - Recent (12 mo) or future (30 days) visit within the authorizing provider's specialty    Patient had office visit in the last 12 months or has a visit in the next 30 days with authorizing provider or within the authorizing provider's specialty.  See \"Patient Info\" tab in inbasket, or \"Choose Columns\" in Meds & Orders section of the refill encounter.             Passed - Patient is age 18 or older       Passed - No active pregnancy on record       Passed - Normal serum creatinine on file in past 12 months    Recent Labs   Lab Test  10/25/18   0743   CR  0.81            Passed - Normal serum potassium on file in past 12 months    Recent Labs   Lab Test  10/25/18   0743   POTASSIUM  3.6                   Passed - No positive pregnancy test in past 12 months          "

## 2018-11-05 ENCOUNTER — OFFICE VISIT (OUTPATIENT)
Dept: INTERNAL MEDICINE | Facility: CLINIC | Age: 63
End: 2018-11-05
Payer: COMMERCIAL

## 2018-11-05 VITALS
BODY MASS INDEX: 38.73 KG/M2 | HEART RATE: 72 BPM | SYSTOLIC BLOOD PRESSURE: 132 MMHG | TEMPERATURE: 98.9 F | DIASTOLIC BLOOD PRESSURE: 82 MMHG | HEIGHT: 66 IN | OXYGEN SATURATION: 95 % | WEIGHT: 241 LBS

## 2018-11-05 DIAGNOSIS — Z00.01 ENCOUNTER FOR ROUTINE ADULT MEDICAL EXAM WITH ABNORMAL FINDINGS: Primary | ICD-10-CM

## 2018-11-05 DIAGNOSIS — I10 ESSENTIAL HYPERTENSION, BENIGN: ICD-10-CM

## 2018-11-05 DIAGNOSIS — E66.01 SEVERE OBESITY (BMI 35.0-39.9) WITH COMORBIDITY (H): ICD-10-CM

## 2018-11-05 DIAGNOSIS — E11.9 TYPE 2 DIABETES MELLITUS WITHOUT COMPLICATION, WITHOUT LONG-TERM CURRENT USE OF INSULIN (H): ICD-10-CM

## 2018-11-05 DIAGNOSIS — Z23 NEED FOR PROPHYLACTIC VACCINATION AND INOCULATION AGAINST INFLUENZA: ICD-10-CM

## 2018-11-05 DIAGNOSIS — E78.5 HYPERLIPIDEMIA WITH TARGET LDL LESS THAN 100: ICD-10-CM

## 2018-11-05 PROCEDURE — 90682 RIV4 VACC RECOMBINANT DNA IM: CPT | Performed by: INTERNAL MEDICINE

## 2018-11-05 PROCEDURE — 99396 PREV VISIT EST AGE 40-64: CPT | Mod: 25 | Performed by: INTERNAL MEDICINE

## 2018-11-05 PROCEDURE — 90471 IMMUNIZATION ADMIN: CPT | Performed by: INTERNAL MEDICINE

## 2018-11-05 RX ORDER — PRAVASTATIN SODIUM 20 MG
20 TABLET ORAL DAILY
Qty: 30 TABLET | Refills: 11 | Status: SHIPPED | OUTPATIENT
Start: 2018-11-05 | End: 2019-11-19

## 2018-11-05 RX ORDER — AMLODIPINE BESYLATE 5 MG/1
5 TABLET ORAL DAILY
Qty: 30 TABLET | Refills: 11 | Status: SHIPPED | OUTPATIENT
Start: 2018-11-05 | End: 2019-12-02

## 2018-11-05 RX ORDER — LOSARTAN POTASSIUM AND HYDROCHLOROTHIAZIDE 25; 100 MG/1; MG/1
1 TABLET ORAL DAILY
Qty: 30 TABLET | Refills: 11 | Status: SHIPPED | OUTPATIENT
Start: 2018-11-05 | End: 2019-11-19

## 2018-11-05 RX ORDER — METFORMIN HCL 500 MG
TABLET, EXTENDED RELEASE 24 HR ORAL
Qty: 60 TABLET | Refills: 11 | Status: SHIPPED | OUTPATIENT
Start: 2018-11-05 | End: 2019-11-19

## 2018-11-05 NOTE — MR AVS SNAPSHOT
After Visit Summary   11/5/2018    Kristen Moser    MRN: [id number]           Patient Information     Date Of Birth          1955        Visit Information        Provider Department      11/5/2018 7:00 AM Radames Reed MD [facility]        Today's Diagnoses     Type 2 diabetes mellitus without complication, without long-term current use of insulin (H)    -  1    Screening for diabetic peripheral neuropathy        Need for prophylactic vaccination and inoculation against influenza          Care Instructions     Flu vaccine   Check with insurance or speak with your pharmacist re: Shingrix vaccine coverage for shingles prevention.  This is a 2 shot series done 2-6 months apart  Eye exam  Bone density - [facility]  Calorie/carbohydrate (sugar, bread, potato, pasta, etc) reduction in diet for weight loss.  Increase color on your plate with fruits and vegetables. Increase  frequency of walking or other aerobic exercise as able (goal is daily)  Continue other meds.  Prescriptions refilled.    Call  [phone]  to schedule a future lab appointment  non-fasting in May 2019 (blood and urine)  Follow up with me a few days after these future labs are drawn to review results and other medical issues as necessary            Follow-ups after your visit        Follow-up notes from your care team     Return in about 6 months (around 5/5/2019) for with pre-visit nonfasting lab appt a few days rubia diabetes follow-up.      Future tests that were ordered for you today     Open Future Orders        Priority Expected Expires Ordered    Hemoglobin A1c Routine 5/4/2019 9/1/2019 11/5/2018    Albumin Random Urine Quantitative with Creat Ratio Routine 5/4/2019 10/1/2019 11/5/2018    Basic metabolic panel Routine 5/4/2019 9/1/2019 11/5/2018            Who to contact     If you have questions or need follow up information about today's clinic visit or your schedule please contact [facility]  [facility] directly at [phone].  Normal or non-critical lab and imaging results will be communicated to you by MyChart, letter or phone within 4 business days after the clinic has received the results. If you do not hear from us within 7 days, please contact the clinic through MyChart or phone. If you have a critical or abnormal lab result, we will notify you by phone as soon as possible.  Submit refill requests through [employer] or call your pharmacy and they will forward the refill request to us. Please allow 3 business days for your refill to be completed.          Additional Information About Your Visit        Care EveryWhere ID     This is your Care EveryWhere ID. This could be used by other organizations to access your [place] medical records  [id number]        Your Vitals Were     Pulse Temperature Height Last Period Pulse Oximetry BMI (Body Mass Index)    72 98.9  F (37.2  C) (Oral) 5' 5.5\" (1.664 m) 10/26/2006 95% 39.49 kg/m2       Blood Pressure from Last 3 Encounters:   11/05/18 132/82   09/19/17 122/80   06/08/15 122/82    Weight from Last 3 Encounters:   11/05/18 241 lb (109.3 kg)   09/19/17 247 lb (112 kg)   06/08/15 244 lb (110.7 kg)                 Today's Medication Changes          These changes are accurate as of 11/5/18  7:35 AM.  If you have any questions, ask your nurse or doctor.               Stop taking these medicines if you haven't already. Please contact your care team if you have questions.     ibuprofen 600 MG tablet   Commonly known as:  ADVIL/MOTRIN   Stopped by:  Radames Reed MD                    Primary Care Provider Office Phone # Fax #    Radames Reed [phone]       [address] [facility] [address]        Equal Access to Services     [facility] LIZZETH : Hao Garcia, neymar belle, qaybta carlene ocasio, melissa mazariegos. [facility] [phone].    ATENCIÓN: Si habla español, tiene a palumbo disposición " servicios gratuitos de asistencia lingüística. Andressa lal [phone].    We comply with applicable federal civil rights laws and [place] laws. We do not discriminate on the basis of race, color, national origin, age, disability, sex, sexual orientation, or gender identity.            Thank you!     Thank you [place] [facility] [place] [place] your [place]. Our goal is always to provide you with excellent care. Hearing back from our patients is one way we can continue to improve our services. Please take a few minutes to complete the written survey that you may receive in the mail after your visit with us. Thank you!             Your Updated Medication List - Protect others around you: Learn how to safely use, store and throw away your medicines at www.disposemymeds.org.          This list is accurate as of 11/5/18  7:35 AM.  Always use your most recent med list.                   Brand Name Dispense Instructions for use Diagnosis    amLODIPine 5 MG tablet    NORVASC    90 tablet    Take 1 tablet (5 mg) by mouth daily    Essential hypertension, benign       aspirin 81 MG tablet     100    1 tab po QD (Once per day)        CALCIUM 600+D 600-200 MG-UNIT Tabs   Generic drug:  calcium carbonate-vitamin D      Take 1 tablet by mouth 2 times daily        losartan-hydrochlorothiazide 100-25 MG per tablet    HYZAAR    30 tablet    TAKE 1 TABLET BY MOUTH DAILY    Essential hypertension, benign       metFORMIN 500 MG 24 hr tablet    GLUCOPHAGE-XR    180 tablet    TAKE 2 TABLETS (1,000 MG) BY MOUTH DAILY    Type 2 diabetes mellitus without complication, without long-term current use of insulin (H)       pravastatin 20 MG tablet    PRAVACHOL    30 tablet    TAKE 1 TABLET (20 MG) BY MOUTH DAILY    Hyperlipidemia with target LDL less than 100       vitamin D 2000 units tablet     100 tablet    Take 2,000 Units by mouth daily.    Vitamin D deficiency disease

## 2018-11-05 NOTE — PROGRESS NOTES
SUBJECTIVE:   CC: Kristen Moser is an 63 year old woman who presents for preventive health visit.     Physical   Annual:     Getting at least 3 servings of Calcium per day:  NO    Bi-annual eye exam:  Yes    Dental care twice a year:  Yes    Sleep apnea or symptoms of sleep apnea:  Sleep apnea    Diet:  Regular (no restrictions)    Frequency of exercise:  None    Taking medications regularly:  Yes    Medication side effects:  None    Additional concerns today:  No    Question 1.  In the last 12 months: We worried food would run out before we had money to buy more. Never True    Question 2.  In the last 12 months: The food we bought just didn't last and we didn't have money to buy more. Never True    Did the patient answer Sometimes True or Often True to EITHER Question 1 or Question 2? No          Today's PHQ-2 Score:   PHQ-2 ( 1999 [employer]) 11/5/2018   Q1: Little interest or pleasure in doing things 0   Q2: Feeling down, depressed or hopeless 0   PHQ-2 Score 0   Q1: Little interest or pleasure in doing things Not at all   Q2: Feeling down, depressed or hopeless Not at all   PHQ-2 Score 0       Abuse: Current or Past(Physical, Sexual or Emotional)- No  Do you feel safe in your environment - Yes    Social History   Substance Use Topics     Smoking status: Never Smoker     Smokeless tobacco: Never Used     Alcohol use No      Comment: 1 drink / month     Alcohol Use 11/5/2018   If you drink alcohol do you typically have greater than 3 drinks per day OR greater than 7 drinks per week? No       Reviewed orders with patient.  Reviewed health maintenance and updated orders accordingly - Yes  Labs reviewed in EPIC        Pertinent mammograms are reviewed under the imaging tab.  History of abnormal Pap smear: NO - age 30-65 PAP every 5 years with negative HPV co-testing recommended  PAP / HPV 6/9/2014 4/1/2011 4/13/2009   PAP NIL NIL NIL     Reviewed and updated as needed this visit by clinical staff         Reviewed and  "updated as needed this visit by Provider            Review of Systems  CONSTITUTIONAL: NEGATIVE for fever, chills. Weight down 6 pounds  INTEGUMENTARY/SKIN: NEGATIVE for worrisome rashes, moles or lesions  EYES: NEGATIVE for vision changes or irritation. Has glasses. Due for eye exam  ENT: NEGATIVE for ear, mouth and throat problems  RESP: NEGATIVE for significant cough or SOB. Has DIXIE. Not using CPAP. Good energy overall during the day  BREAST: NEGATIVE for masses, tenderness or discharge  CV: NEGATIVE for chest pain, palpitations or peripheral edema  GI: NEGATIVE for nausea, abdominal pain, heartburn, or change in bowel habits  : NEGATIVE for unusual urinary or vaginal symptoms. No vaginal bleeding.  MUSCULOSKELETAL: NEGATIVE for significant arthralgias or myalgia  ENDO: POSITIVE for DM and Lipid Rx  NEURO: NEGATIVE for weakness, dizziness or paresthesias  PSYCHIATRIC: NEGATIVE for changes in mood or affect      OBJECTIVE:   /82 (BP Location: Left arm, Patient Position: Chair, Cuff Size: Adult Large)  Pulse 72  Temp 98.9  F (37.2  C) (Oral)  Ht 5' 5.5\" (1.664 m)  Wt 241 lb (109.3 kg)  LMP 10/26/2006  SpO2 95%  BMI 39.49 kg/m2  Physical Exam  General appearance - alert, no distress  Skin - No rashes or lesions. Few seborrheic keratosis ontrunk  Head - normocephalic, atraumatic  Eyes - TAMIR, EOMI, fundi exam with nondilated pupils negative.  Ears - External ears normal. Canals clear. TM's normal.  Nose/Sinuses - Nares normal. Septum midline. Mucosa normal. No drainage or sinus tenderness.  Oropharynx - No erythema, no adenopathy, no exudates.  Neck - Supple without adenopathy or thyromegaly. No bruits.  Lungs - Clear to auscultation without wheezes/rhonchi.  Heart - Regular rate and rhythm without murmurs, clicks, or gallops.  Nodes - No supraclavicular, axillary, or inguinal adenopathy palpable.  Breasts - deferred  Abdomen - Abdomen obese, soft, non-tender. BS normal. No masses or " hepatosplenomegaly palpable. No bruits.  Extremities -No cyanosis, clubbing or edema.    Musculoskeletal - Spine ROM normal. Muscular strength intact.   Peripheral pulses - radial=4/4, femoral=4/4, posterior tibial=4/4, dorsalis pedis=4/4,  Neuro - Gait normal. Reflexes normal and symmetric. Sensation grossly WNL.  Genital/Rectal - deferred          ASSESSMENT/PLAN:   1. Encounter for routine adult medical exam with abnormal findings   See plan discussion below for HCM    2. Type 2 diabetes mellitus without complication, without long-term current use of insulin (H)  controlled  - Hemoglobin A1c; Future  - Albumin Random Urine Quantitative with Creat Ratio; Future  - Basic metabolic panel; Future  - metFORMIN (GLUCOPHAGE-XR) 500 MG 24 hr tablet; TAKE 2 TABLETS (1,000 MG) BY MOUTH DAILY  Dispense: 60 tablet; Refill: 11    3. Severe obesity (BMI 35.0-39.9) with comorbidity (H)  Contributing to DIXIE, HTN and DM. See counseling below    4. Essential hypertension, benign  controlled  - losartan-hydrochlorothiazide (HYZAAR) 100-25 MG per tablet; Take 1 tablet by mouth daily  Dispense: 30 tablet; Refill: 11  - amLODIPine (NORVASC) 5 MG tablet; Take 1 tablet (5 mg) by mouth daily  Dispense: 30 tablet; Refill: 11  - Comprehensive metabolic panel; Future    5. Hyperlipidemia with target LDL less than 100  controlled  - pravastatin (PRAVACHOL) 20 MG tablet; Take 1 tablet (20 mg) by mouth daily  Dispense: 30 tablet; Refill: 11  - Lipid panel reflex to direct LDL Fasting; Future  - Comprehensive metabolic panel; Future    6. Need for prophylactic vaccination and inoculation against influenza  - FLU VACCINE, (RIV4) RECOMBINANT HA  , IM (FluBlok, egg free) [28998]- >18 YRS (FMG recommended  50-64 YRS)  - Vaccine Administration, Initial [02584]      COUNSELING:  Reviewed preventive health counseling, as reflected in patient instructions    BP Readings from Last 1 Encounters:   09/19/17 122/80     Estimated body mass index is 40.48  "kg/(m^2) as calculated from the following:    Height as of 9/19/17: 5' 5.5\" (1.664 m).    Weight as of 9/19/17: 247 lb (112 kg).      Weight management plan: Discussed healthy diet and exercise guidelines and patient will follow up in 6 months in clinic to re-evaluate.     reports that she has never smoked. She has never used smokeless tobacco.      Counseling Resources:  ATP IV Guidelines  Pooled Cohorts Equation Calculator  Breast Cancer Risk Calculator  FRAX Risk Assessment  ICSI Preventive Guidelines  Dietary Guidelines for Americans, 2010  USDA's MyPlate  ASA Prophylaxis  Lung CA Screening      PLAN:   Flu vaccine   Check with insurance or speak with your pharmacist re: Shingrix vaccine coverage for shingles prevention.  This is a 2 shot series done 2-6 months apart  Eye exam  Bone density - [facility]  Calorie/carbohydrate (sugar, bread, potato, pasta, etc) reduction in diet for weight loss.  Increase color on your plate with fruits and vegetables. Increase  frequency of walking or other aerobic exercise as able (goal is daily)  Continue other meds.  Prescriptions refilled.    Call  [phone]  to schedule a future lab appointment  non-fasting in May 2019 (blood and urine)  Follow up with me a few days after these future labs are drawn to review results and other medical issues as necessary         Radames Reed MD  [facility]      "

## 2018-11-05 NOTE — PATIENT INSTRUCTIONS
Flu vaccine   Check with insurance or speak with your pharmacist re: Shingrix vaccine coverage for shingles prevention.  This is a 2 shot series done 2-6 months apart  Eye exam  Bone density - [place]  Calorie/carbohydrate (sugar, bread, potato, pasta, etc) reduction in diet for weight loss.  Increase color on your plate with fruits and vegetables. Increase  frequency of walking or other aerobic exercise as able (goal is daily)  Continue other meds.  Prescriptions refilled.    Call  [phone]  to schedule a future lab appointment  non-fasting in May 2019 (blood and urine)  Follow up with me a few days after these future labs are drawn to review results and other medical issues as necessary

## 2018-11-05 NOTE — PROGRESS NOTES

## 2019-05-13 ENCOUNTER — TRANSFERRED RECORDS (OUTPATIENT)
Dept: MULTI SPECIALTY CLINIC | Facility: CLINIC | Age: 64
End: 2019-05-13

## 2019-05-13 ENCOUNTER — TRANSFERRED RECORDS (OUTPATIENT)
Dept: HEALTH INFORMATION MANAGEMENT | Facility: CLINIC | Age: 64
End: 2019-05-13

## 2019-05-13 LAB — RETINOPATHY: NEGATIVE

## 2019-08-19 ENCOUNTER — TELEPHONE (OUTPATIENT)
Dept: INTERNAL MEDICINE | Facility: CLINIC | Age: 64
End: 2019-08-19

## 2019-08-19 NOTE — TELEPHONE ENCOUNTER
Panel Management Review    Patient Active Problem List   Diagnosis     Essential hypertension, benign     Elevated sedimentation rate     DIXIE (obstructive sleep apnea)     Hyperlipidemia with target LDL less than 100     Health Care Home     Advanced directives, counseling/discussion     Vitamin D deficiency     Type 2 diabetes mellitus without complication, without long-term current use of insulin (H)     Colon polyp     Severe obesity (BMI 35.0-39.9) with comorbidity (H)       Patient has the following on her problem list:     Diabetes    ASA: Passed    Last A1C  Lab Results   Component Value Date    A1C 6.9 10/25/2018    A1C 6.9 04/26/2018    A1C 6.8 09/19/2017    A1C 6.3 06/03/2015    A1C 6.0 06/17/2014     A1C tested: Passed    Last LDL:    Lab Results   Component Value Date    CHOL 127 10/25/2018     Lab Results   Component Value Date    HDL 51 10/25/2018     Lab Results   Component Value Date    LDL 50 10/25/2018     Lab Results   Component Value Date    TRIG 132 10/25/2018     Lab Results   Component Value Date    CHOLHDLRATIO 2.8 06/03/2015     Lab Results   Component Value Date    NHDL 76 10/25/2018       Is the patient on a Statin? YES             Is the patient on Aspirin? YES    Medications     HMG CoA Reductase Inhibitors     pravastatin (PRAVACHOL) 20 MG tablet       Salicylates     ASPIRIN 81 MG OR TABS             Last three blood pressure readings:  BP Readings from Last 3 Encounters:   11/05/18 132/82   09/19/17 122/80   06/08/15 122/82       Date of last diabetes office visit: 11/05/2018     Tobacco History:     History   Smoking Status     Never Smoker   Smokeless Tobacco     Never Used         Hypertension   Last three blood pressure readings:  BP Readings from Last 3 Encounters:   11/05/18 132/82   09/19/17 122/80   06/08/15 122/82     Blood pressure: Passed    HTN Guidelines:  Less than 140/90      Composite cancer screening  Chart review shows that this patient is due/due soon for the  following Px  Summary:    Patient is due/failing the following:   PHYSICAL    Action needed:   Patient needs office visit for Px in November 2019.    Type of outreach:    Phone, spoke to patient.  Scheduled for Fasting Labs on Monday,10/21/2019 and Px with  on Monday, 11/19/2019.     Questions for provider review:    None                                                                                                                                    Susanna Tinsley [facility]       Chart routed to none   .

## 2019-10-07 ENCOUNTER — DOCUMENTATION ONLY (OUTPATIENT)
Dept: INTERNAL MEDICINE | Facility: CLINIC | Age: 64
End: 2019-10-07

## 2019-10-07 DIAGNOSIS — E11.9 TYPE 2 DIABETES MELLITUS WITHOUT COMPLICATION, WITHOUT LONG-TERM CURRENT USE OF INSULIN (H): Primary | ICD-10-CM

## 2019-10-21 ENCOUNTER — ANCILLARY PROCEDURE (OUTPATIENT)
Dept: MAMMOGRAPHY | Facility: CLINIC | Age: 64
End: 2019-10-21
Attending: INTERNAL MEDICINE
Payer: COMMERCIAL

## 2019-10-21 DIAGNOSIS — I10 ESSENTIAL HYPERTENSION, BENIGN: ICD-10-CM

## 2019-10-21 DIAGNOSIS — E78.5 HYPERLIPIDEMIA WITH TARGET LDL LESS THAN 100: ICD-10-CM

## 2019-10-21 DIAGNOSIS — E11.9 TYPE 2 DIABETES MELLITUS WITHOUT COMPLICATION, WITHOUT LONG-TERM CURRENT USE OF INSULIN (H): ICD-10-CM

## 2019-10-21 DIAGNOSIS — Z12.31 VISIT FOR SCREENING MAMMOGRAM: ICD-10-CM

## 2019-10-21 LAB
ALBUMIN SERPL-MCNC: 3.3 G/DL (ref 3.4–5)
ALP SERPL-CCNC: 69 U/L (ref 40–150)
ALT SERPL W P-5'-P-CCNC: 30 U/L (ref 0–50)
ANION GAP SERPL CALCULATED.3IONS-SCNC: 5 MMOL/L (ref 3–14)
AST SERPL W P-5'-P-CCNC: 22 U/L (ref 0–45)
BILIRUB SERPL-MCNC: 0.6 MG/DL (ref 0.2–1.3)
BUN SERPL-MCNC: 16 MG/DL (ref 7–30)
CALCIUM SERPL-MCNC: 8.8 MG/DL (ref 8.5–10.1)
CHLORIDE SERPL-SCNC: 103 MMOL/L (ref 94–109)
CHOLEST SERPL-MCNC: 129 MG/DL
CO2 SERPL-SCNC: 31 MMOL/L (ref 20–32)
CREAT SERPL-MCNC: 0.72 MG/DL (ref 0.52–1.04)
GFR SERPL CREATININE-BSD FRML MDRD: 88 ML/MIN/{1.73_M2}
GLUCOSE SERPL-MCNC: 155 MG/DL (ref 70–99)
HBA1C MFR BLD: 7.3 % (ref 0–5.6)
HDLC SERPL-MCNC: 48 MG/DL
LDLC SERPL CALC-MCNC: 57 MG/DL
NONHDLC SERPL-MCNC: 81 MG/DL
POTASSIUM SERPL-SCNC: 3.3 MMOL/L (ref 3.4–5.3)
PROT SERPL-MCNC: 7.4 G/DL (ref 6.8–8.8)
SODIUM SERPL-SCNC: 139 MMOL/L (ref 133–144)
T4 FREE SERPL-MCNC: 1.06 NG/DL (ref 0.76–1.46)
TRIGL SERPL-MCNC: 122 MG/DL
TSH SERPL DL<=0.005 MIU/L-ACNC: 4.92 MU/L (ref 0.4–4)

## 2019-10-21 PROCEDURE — 83036 HEMOGLOBIN GLYCOSYLATED A1C: CPT | Performed by: INTERNAL MEDICINE

## 2019-10-21 PROCEDURE — 84443 ASSAY THYROID STIM HORMONE: CPT | Performed by: INTERNAL MEDICINE

## 2019-10-21 PROCEDURE — 77067 SCR MAMMO BI INCL CAD: CPT | Mod: TC

## 2019-10-21 PROCEDURE — 36415 COLL VENOUS BLD VENIPUNCTURE: CPT | Performed by: INTERNAL MEDICINE

## 2019-10-21 PROCEDURE — 80061 LIPID PANEL: CPT | Performed by: INTERNAL MEDICINE

## 2019-10-21 PROCEDURE — 84439 ASSAY OF FREE THYROXINE: CPT | Performed by: INTERNAL MEDICINE

## 2019-10-21 PROCEDURE — 80053 COMPREHEN METABOLIC PANEL: CPT | Performed by: INTERNAL MEDICINE

## 2019-10-21 PROCEDURE — 77063 BREAST TOMOSYNTHESIS BI: CPT | Mod: TC

## 2019-11-04 DIAGNOSIS — I10 ESSENTIAL HYPERTENSION, BENIGN: ICD-10-CM

## 2019-11-05 RX ORDER — LOSARTAN POTASSIUM AND HYDROCHLOROTHIAZIDE 25; 100 MG/1; MG/1
1 TABLET ORAL DAILY
Qty: 90 TABLET | Refills: 1 | OUTPATIENT
Start: 2019-11-05

## 2019-11-05 RX ORDER — LOSARTAN POTASSIUM 100 MG/1
100 TABLET ORAL DAILY
Qty: 90 TABLET | Refills: 3 | Status: SHIPPED | OUTPATIENT
Start: 2019-11-05 | End: 2020-02-05

## 2019-11-05 RX ORDER — HYDROCHLOROTHIAZIDE 25 MG/1
25 TABLET ORAL DAILY
Qty: 90 TABLET | Refills: 3 | Status: SHIPPED | OUTPATIENT
Start: 2019-11-05 | End: 2020-05-18 | Stop reason: ALTCHOICE

## 2019-11-05 NOTE — TELEPHONE ENCOUNTER
Pt has appt to see me in a couple weeks and will recheck labs at that time.  Per pharmacy, combination Losartan HCT is no available now and on back order. Will therefore  Do RXs for the two separate tabs. Pt to toake Losartan 100mg tab,1 tab daily plus hydrochlorothiazide 25mg tab, 1 tab daily for blood pressure. Rxs faxed. Inform pt and see me as scheduled

## 2019-11-05 NOTE — TELEPHONE ENCOUNTER
"Requested Prescriptions   Pending Prescriptions Disp Refills     losartan-hydrochlorothiazide (HYZAAR) 100-25 MG tablet [Pharmacy Med Name: LOSARTAN-HCTZ 100-25 MG TAB]  Last Written Prescription Date:  11/05/2018  Last Fill Quantity: 30,  # refills: 11   Last Office Visit: 11/5/2018   Future Office Visit:    Next 5 appointments (look out 90 days)    Nov 19, 2019  7:00 AM CST  [facility] [address] [place] [id number] [phone]          90 tablet 1     Sig: TAKE 1 TABLET BY MOUTH DAILY       Angiotensin-II Receptors Failed - 11/4/2019  9:10 AM        Failed - Normal serum potassium on file in past 12 months     Recent Labs   Lab Test 10/21/19  0735   POTASSIUM 3.3*                    Passed - Last blood pressure under 140/90 in past 12 months     BP Readings from Last 3 Encounters:   11/05/18 132/82   09/19/17 122/80   06/08/15 122/82                 Passed - Recent (12 mo) or future (30 days) visit within the authorizing provider's specialty     Patient has had an office visit with the authorizing provider or a provider within the authorizing providers department within the previous 12 mos or has a future within next 30 days. See \"Patient Info\" tab in inbasket, or \"Choose Columns\" in Meds & Orders section of the refill encounter.              Passed - Medication is active on med list        Passed - Patient is age 18 or older        Passed - No active pregnancy on record        Passed - Normal serum creatinine on file in past 12 months     Recent Labs   Lab Test 10/21/19  0735   CR 0.72             Passed - No positive pregnancy test in past 12 months          "

## 2019-11-06 PROBLEM — E03.9 HYPOTHYROIDISM, UNSPECIFIED TYPE: Status: ACTIVE | Noted: 2019-11-06

## 2019-11-06 PROBLEM — E87.6 HYPOKALEMIA: Status: ACTIVE | Noted: 2019-11-06

## 2019-11-19 ENCOUNTER — OFFICE VISIT (OUTPATIENT)
Dept: INTERNAL MEDICINE | Facility: CLINIC | Age: 64
End: 2019-11-19
Payer: COMMERCIAL

## 2019-11-19 VITALS
HEART RATE: 80 BPM | BODY MASS INDEX: 40.46 KG/M2 | SYSTOLIC BLOOD PRESSURE: 124 MMHG | OXYGEN SATURATION: 95 % | HEIGHT: 64 IN | WEIGHT: 237 LBS | RESPIRATION RATE: 16 BRPM | DIASTOLIC BLOOD PRESSURE: 80 MMHG | TEMPERATURE: 97.8 F

## 2019-11-19 DIAGNOSIS — E11.9 TYPE 2 DIABETES MELLITUS WITHOUT COMPLICATION, WITHOUT LONG-TERM CURRENT USE OF INSULIN (H): ICD-10-CM

## 2019-11-19 DIAGNOSIS — Z00.00 MEDICARE ANNUAL WELLNESS VISIT, INITIAL: Primary | ICD-10-CM

## 2019-11-19 DIAGNOSIS — Z23 NEED FOR PROPHYLACTIC VACCINATION AGAINST STREPTOCOCCUS PNEUMONIAE (PNEUMOCOCCUS): ICD-10-CM

## 2019-11-19 DIAGNOSIS — I10 ESSENTIAL HYPERTENSION, BENIGN: ICD-10-CM

## 2019-11-19 DIAGNOSIS — E87.6 HYPOKALEMIA: ICD-10-CM

## 2019-11-19 DIAGNOSIS — E66.01 MORBID OBESITY (H): ICD-10-CM

## 2019-11-19 DIAGNOSIS — E03.9 HYPOTHYROIDISM, UNSPECIFIED TYPE: ICD-10-CM

## 2019-11-19 DIAGNOSIS — E78.5 HYPERLIPIDEMIA WITH TARGET LDL LESS THAN 100: ICD-10-CM

## 2019-11-19 LAB
CREAT UR-MCNC: 68 MG/DL
MICROALBUMIN UR-MCNC: 9 MG/L
MICROALBUMIN/CREAT UR: 13 MG/G CR (ref 0–25)
POTASSIUM SERPL-SCNC: 3.7 MMOL/L (ref 3.4–5.3)
TSH SERPL DL<=0.005 MIU/L-ACNC: 2.94 MU/L (ref 0.4–4)

## 2019-11-19 PROCEDURE — 86800 THYROGLOBULIN ANTIBODY: CPT | Performed by: INTERNAL MEDICINE

## 2019-11-19 PROCEDURE — G0438 PPPS, INITIAL VISIT: HCPCS | Performed by: INTERNAL MEDICINE

## 2019-11-19 PROCEDURE — 99213 OFFICE O/P EST LOW 20 MIN: CPT | Mod: 25 | Performed by: INTERNAL MEDICINE

## 2019-11-19 PROCEDURE — G0009 ADMIN PNEUMOCOCCAL VACCINE: HCPCS | Performed by: INTERNAL MEDICINE

## 2019-11-19 PROCEDURE — 36415 COLL VENOUS BLD VENIPUNCTURE: CPT | Performed by: INTERNAL MEDICINE

## 2019-11-19 PROCEDURE — 84443 ASSAY THYROID STIM HORMONE: CPT | Performed by: INTERNAL MEDICINE

## 2019-11-19 PROCEDURE — 84132 ASSAY OF SERUM POTASSIUM: CPT | Performed by: INTERNAL MEDICINE

## 2019-11-19 PROCEDURE — 90670 PCV13 VACCINE IM: CPT | Performed by: INTERNAL MEDICINE

## 2019-11-19 PROCEDURE — 82043 UR ALBUMIN QUANTITATIVE: CPT | Performed by: INTERNAL MEDICINE

## 2019-11-19 RX ORDER — LANCETS
EACH MISCELLANEOUS
Qty: 100 EACH | Refills: 3 | Status: SHIPPED | OUTPATIENT
Start: 2019-11-19 | End: 2023-10-15

## 2019-11-19 RX ORDER — METFORMIN HCL 500 MG
TABLET, EXTENDED RELEASE 24 HR ORAL
Qty: 360 TABLET | Refills: 3 | Status: SHIPPED | OUTPATIENT
Start: 2019-11-19 | End: 2020-06-04

## 2019-11-19 RX ORDER — PRAVASTATIN SODIUM 20 MG
20 TABLET ORAL DAILY
Qty: 90 TABLET | Refills: 3 | Status: SHIPPED | OUTPATIENT
Start: 2019-11-19 | End: 2020-06-04

## 2019-11-19 ASSESSMENT — ACTIVITIES OF DAILY LIVING (ADL): CURRENT_FUNCTION: NO ASSISTANCE NEEDED

## 2019-11-19 ASSESSMENT — MIFFLIN-ST. JEOR: SCORE: 1605.02

## 2019-11-19 NOTE — PROGRESS NOTES
"SUBJECTIVE:   Kristen Moser is a 64 year old female who presents for Preventive Visit and follow-up re: diabetes, hypoththyroidism, hypertension, lipids  Are you in the first 12 months of your Medicare coverage?  No    Healthy Habits:     In general, how would you rate your overall health?  Good    Frequency of exercise:  None    Do you usually eat at least 4 servings of fruit and vegetables a day, include whole grains    & fiber and avoid regularly eating high fat or \"junk\" foods?  No    Taking medications regularly:  Yes    Medication side effects:  None    Ability to successfully perform activities of daily living:  No assistance needed    Home Safety:  No safety concerns identified    Hearing Impairment:  No hearing concerns    In the past 6 months, have you been bothered by leaking of urine?  No    In general, how would you rate your overall mental or emotional health?  Good      PHQ-2 Total Score: 0    Additional concerns today:  No    Do you feel safe in your environment? Yes    Have you ever done Advance Care Planning? (For example, a Health Directive, POLST, or a discussion with a medical provider or your loved ones about your wishes): No, advance care planning information given to patient to review.  Patient plans to discuss their wishes with loved ones or provider.        Fall risk  Fallen 2 or more times in the past year?: No  Any fall with injury in the past year?: No    Cognitive Screening   1) Repeat 3 items (Leader, Season, Table)    2) Clock draw: NORMAL  3) 3 item recall: Recalls 3 objects  Results: NORMAL clock, 1-2 items recalled: COGNITIVE IMPAIRMENT LESS LIKELY    Mini-CogTM Copyright NORBERTO Randolph. Licensed by the author for use in [facility]; reprinted with permission (fatmata@.[email or web address]). All rights reserved.      Do you have sleep apnea, excessive snoring or daytime drowsiness?: yes    Reviewed and updated as needed this visit by clinical staff          Colonoscopy Jan 2018. Repeat 5 " years  Eye exam  June 2019 - [facility]    Reviewed and updated as needed this visit by Provider        Social History     Tobacco Use     Smoking status: Never Smoker     Smokeless tobacco: Never Used   Substance Use Topics     Alcohol use: No     Comment: 1 drink / month     If you drink alcohol do you typically have >3 drinks per day or >7 drinks per week? No    Alcohol Use 11/19/2019   Prescreen: >3 drinks/day or >7 drinks/week? No   Prescreen: >3 drinks/day or >7 drinks/week? -               Current providers sharing in care for this patient include:   Patient Care Team:  Radames Reed MD as PCP - General  Radames Reed MD as Assigned PCP    The following health maintenance items are reviewed in Epic and correct as of today:  Health Maintenance   Topic Date Due     ADVANCE CARE PLANNING  01/12/2017     PHQ-9  03/19/2018     EYE EXAM  09/15/2018     MICROALBUMIN  09/19/2018     DIABETIC FOOT EXAM  09/19/2018     FALL RISK ASSESSMENT  08/16/2019     INFLUENZA VACCINE (1) 09/01/2019     PNEUMOCOCCAL IMMUNIZATION 65+ LOW/MEDIUM RISK (1 of 2 - PCV13) 08/16/2019     MEDICARE ANNUAL WELLNESS VISIT  11/05/2019     ZOSTER IMMUNIZATION (2 of 2) 11/30/2019     A1C  04/21/2020     ALT  10/21/2020     BMP  10/21/2020     LIPID  10/21/2020     CREATININE  10/21/2020     TSH W/FREE T4 REFLEX  10/21/2021     MAMMO SCREENING  10/21/2021     COLONOSCOPY  01/16/2023     DTAP/TDAP/TD IMMUNIZATION (3 - Td) 02/21/2023     DEXA  Completed     HEPATITIS C SCREENING  Completed     DEPRESSION ACTION PLAN  Completed     HIV SCREENING  Addressed     IPV IMMUNIZATION  Aged Out     MENINGITIS IMMUNIZATION  Aged Out     Labs reviewed in EPIC      Component      Latest Ref Rng & Units 4/26/2018 10/25/2018 10/21/2019   Sodium      133 - 144 mmol/L 141 138 139   Potassium      3.4 - 5.3 mmol/L 4.1 3.6 3.3 (L)   Chloride      94 - 109 mmol/L 103 100 103   Carbon Dioxide      20 - 32 mmol/L 30 30 31   Anion Gap      3 - 14 mmol/L 8 8 5    Glucose      70 - 99 mg/dL 167 (H) 128 (H) 155 (H)   Urea Nitrogen      7 - 30 mg/dL 20 15 16   Creatinine      0.52 - 1.04 mg/dL 0.70 0.81 0.72   GFR Estimate      >60 mL/min/1.73:m2 85 71 88   GFR Estimate If Black      >60 mL/min/1.73:m2 >90 86 >90   Calcium      8.5 - 10.1 mg/dL 9.9 9.4 8.8   Bilirubin Total      0.2 - 1.3 mg/dL 0.6 0.7 0.6   Albumin      3.4 - 5.0 g/dL 3.6 3.4 3.3 (L)   Protein Total      6.8 - 8.8 g/dL 8.3 8.2 7.4   Alkaline Phosphatase      40 - 150 U/L 79 68 69   ALT      0 - 50 U/L 38 42 30   AST      0 - 45 U/L 33 38 22   Cholesterol      <200 mg/dL  127 129   Triglycerides      <150 mg/dL  132 122   HDL Cholesterol      >49 mg/dL  51 48 (L)   LDL Cholesterol Calculated      <100 mg/dL  50 57   Non HDL Cholesterol      <130 mg/dL  76 81   Hemoglobin A1C      0 - 5.6 % 6.9 (H) 6.9 (H) 7.3 (H)   TSH      0.40 - 4.00 mU/L   4.92 (H)   T4 Free      0.76 - 1.46 ng/dL   1.06       Review of Systems  CONSTITUTIONAL: NEGATIVE for fever, chills. Weight down 10 pounds in 2 years  INTEGUMENTARY/SKIN: NEGATIVE for worrisome rashes, moles or lesions  EYES: NEGATIVE for vision changes or irritation. Has glasses. Eye exam  June 2019 - [facility]  ENT/MOUTH: NEGATIVE for ear, mouth and throat problems  RESP: NEGATIVE for significant cough or SOB  BREAST: NEGATIVE for masses, tenderness or discharge  CV: NEGATIVE for chest pain, palpitations or peripheral edema  GI: NEGATIVE for nausea, abdominal pain, heartburn, or change in bowel habits  : NEGATIVE for frequency, dysuria, or hematuria  MUSCULOSKELETAL: NEGATIVE for significant arthralgias or myalgia  NEURO: NEGATIVE for weakness, dizziness or paresthesias  ENDOCRINE:  POSITIVE for  Rare hot flash. DM, lipids and thyroid as above. Not checking sugars at home.   HEME: NEGATIVE for bleeding problems  PSYCHIATRIC: NEGATIVE for changes in mood or affect    OBJECTIVE:   /80   Pulse 80   Temp 97.8  F (36.6  C) (Temporal)   Resp 16   Ht  "1.626 m (5' 4\")   Wt 107.5 kg (237 lb)   LMP 10/26/2006   SpO2 95%   BMI 40.68 kg/m   Estimated body mass index is 39.49 kg/m  as calculated from the following:    Height as of 11/5/18: 1.664 m (5' 5.5\").    Weight as of 11/5/18: 109.3 kg (241 lb).  Physical Exam  General appearance -   alert, no distress  Skin - No rashes or lesions.  Head - normocephalic, atraumatic  Eyes - TAMIR, EOMI, fundi exam with nondilated pupils negative.  Ears - External ears normal. Canals clear. TM's normal.  Nose/Sinuses - Nares normal. Septum midline. Mucosa normal. No drainage or sinus tenderness.  Oropharynx - No erythema, no adenopathy, no exudates.  Neck - Supple without adenopathy or thyromegaly. No bruits.  Lungs - Clear to auscultation without wheezes/rhonchi.  Heart - Regular rate and rhythm without murmurs, clicks, or gallops.  Nodes - No supraclavicular, axillary, or inguinal adenopathy palpable.  Breasts - deferred  Abdomen - Abdomen obese, soft, non-tender. BS normal. No masses or hepatosplenomegaly palpable. No bruits.  Extremities -No cyanosis, clubbing or edema.    Musculoskeletal - Spine ROM normal. Muscular strength intact.   Peripheral pulses - radial=4/4, femoral=4/4, posterior tibial=4/4, dorsalis pedis=4/4,  Neuro - Gait normal. Reflexes normal and symmetric. Sensation grossly WNL.  Genital/Rectal - deferred       ASSESSMENT / PLAN:   1. Medicare annual wellness visit, initial  Will get 2nd Shingrix vaccine in December. Due for repeat DEXA.   HCM otherwise UTD.     2. Type 2 diabetes mellitus without complication, without long-term current use of insulin (H)   Not controlled. Will increase metformin. If side effects with higher dose, will add future Invokana. Pt to start using Glucometer  - Albumin Random Urine Quantitative with Creat Ratio  - metFORMIN (GLUCOPHAGE-XR) 500 MG 24 hr tablet; TAKE 4 TABLETS (1,000 MG) BY MOUTH DAILY  Dispense: 360 tablet; Refill: 3  - blood glucose (NO BRAND SPECIFIED) test " "strip; Use to test blood sugar 1 times daily or as directed.  Dispense: 100 strip; Refill: 3  - blood glucose monitoring (NO BRAND SPECIFIED) meter device kit; Use to test blood sugar 1 times daily or as directed.  Dispense: 1 kit; Refill: 0  - thin (NO BRAND SPECIFIED) lancets; Use to test blood sugar 1 times daily or as directed.  Dispense: 100 each; Refill: 3  - Hemoglobin A1c; Future    3. Morbid obesity (H)   Weight improved but still elevated. See counseloing below along with increase Metformin dosage.     4. Hyperlipidemia with target LDL less than 100  Controlled. Continue med. Labs 1 year  - pravastatin (PRAVACHOL) 20 MG tablet; Take 1 tablet (20 mg) by mouth daily  Dispense: 90 tablet; Refill: 3  - Comprehensive metabolic panel; Future  - Lipid panel reflex to direct LDL Fasting; Future    5. Hypokalemia  Needs recheck. If still low, will add potassium supplement. Related to hydrochlorothiazide   - Potassium    6. Hypothyroidism, unspecified type   mild TSH elevation. Labs to assess if permament and if levothyroxine needed  - Anti thyroglobulin antibody  - TSH with free T4 reflex    7. Essential hypertension, benign  Controlled with med    8. Need for prophylactic vaccination against Streptococcus pneumoniae (pneumococcus)  - Pneumococcal vaccine 13 valent PCV13 IM (Prevnar) [51834]  - ADMIN MEDICARE: Pneumococcal Vaccine ()      COUNSELING:  Reviewed preventive health counseling, as reflected in patient instructions    Estimated body mass index is 39.49 kg/m  as calculated from the following:    Height as of 11/5/18: 1.664 m (5' 5.5\").    Weight as of 11/5/18: 109.3 kg (241 lb).    Weight management plan: Discussed healthy diet and exercise guidelines     reports that she has never smoked. She has never used smokeless tobacco.      Appropriate preventive services were discussed with this patient, including applicable screening as appropriate for cardiovascular disease, diabetes, " osteopenia/osteoporosis, and glaucoma.  As appropriate for age/gender, discussed screening for colorectal cancer, prostate cancer, breast cancer, and cervical cancer. Checklist reviewing preventive services available has been given to the patient.    Reviewed patients plan of care and provided an AVS. The Basic Care Plan (routine screening as documented in Health Maintenance) for Kristen meets the Care Plan requirement. This Care Plan has been established and reviewed with the Patient.    Counseling Resources:  ATP IV Guidelines  Pooled Cohorts Equation Calculator  Breast Cancer Risk Calculator  FRAX Risk Assessment  ICSI Preventive Guidelines  Dietary Guidelines for Americans, 2010  USDA's MyPlate  ASA Prophylaxis  Lung CA Screening      PLAN:  Increase metformin to 3 tabs daily for diabetes for 2 weeks. If stools  not too loose, then increase to 4 tabs daily. If loose stool side effects, then contact clinic  Continue other meds.  Prescriptions refilled.    Labs today as ordered  Call  [phone] or use [employer] to schedule a future lab appointment  non-fasting in 3 months for A1C  Continue to reduce calorie/carbohydrate (sugar, bread, potato, pasta, rice, alcohol etc)  intake in diet.  Increase color on your plate with fruits and vegetables. Increase  frequency of walking or other aerobic exercise as able (goal is daily)  Vaccinations: Prevnar 13 vaccine  Pt was informed regarding extra E&M billing for management of new or established medical issues not related to today's wellness visit  In 1 month, then Check blood sugars twice a day (before breakfast and bedtime) for 4 days  and call results to the nurseline [phone]  Second Shingrix vaccine between mid December and mid April       Radames Reed MD  [facility]

## 2019-11-19 NOTE — PATIENT INSTRUCTIONS
Increase metformin to 3 tabs daily for diabetes for 2 weeks. If stools  not too loose, then increase to 4 tabs daily. If loose stool side effects, then contact clinic  Continue other meds.  Prescriptions refilled.    Labs today as ordered  Call  [phone] or use [employer] to schedule a future lab appointment  non-fasting in 3 months for A1C  Continue to reduce calorie/carbohydrate (sugar, bread, potato, pasta, rice, alcohol etc)  intake in diet.  Increase color on your plate with fruits and vegetables. Increase  frequency of walking or other aerobic exercise as able (goal is daily)  Vaccinations: Prevnar 13 vaccine  Pt was informed regarding extra E&M billing for management of new or established medical issues not related to today's wellness visit  In 1 month, then Check blood sugars twice a day (before breakfast and bedtime) for 4 days  and call results to the nurseline [phone]  Second Shingrix vaccine between mid December and mid April

## 2019-11-19 NOTE — LETTER
"[facility]  [address] [address]  [phone]      11/20/2019       Kristen Moser  [address]  [facility] [place] [facility] [address]        Dear Kristen,  Here are your most recent lab results. Unless commented on below, mild variation of results  outside the normal range are not clinically signicant.    Resulted Orders   Albumin Random Urine Quantitative with Creat Ratio   Result Value Ref Range    Creatinine Urine 68 mg/dL    Albumin Urine mg/L 9 mg/L    Albumin Urine mg/g Cr 13.00 0 - 25 mg/g Cr   Anti thyroglobulin antibody   Result Value Ref Range    Thyroglobulin Antibody <20 <40 IU/mL   TSH with free T4 reflex   Result Value Ref Range    TSH 2.94 0.40 - 4.00 mU/L   Potassium   Result Value Ref Range    Potassium 3.7 3.4 - 5.3 mmol/L       Potassium, Thyroid and Urine Protein lab results were normal.  Continue current medication.  Please contact your pharmacy if you need further refills of your medication.  Call our appointment desk at [phone] or use [email or web address] to schedule a \"lab only\" to have your A1C rechecked non-fasting in 3 months.    If you have further questions/concerns regarding the results, I would ask that you bring them to your next follow-up appointment with me and I would be happy to review them with you further.      Sincerely,      Radames Reed MD  Internal Medicine      "

## 2019-11-20 LAB — THYROGLOB AB SERPL IA-ACNC: <20 IU/ML (ref 0–40)

## 2019-12-02 DIAGNOSIS — I10 ESSENTIAL HYPERTENSION, BENIGN: ICD-10-CM

## 2019-12-03 DIAGNOSIS — E11.9 TYPE 2 DIABETES MELLITUS WITHOUT COMPLICATION, WITHOUT LONG-TERM CURRENT USE OF INSULIN (H): ICD-10-CM

## 2019-12-03 RX ORDER — AMLODIPINE BESYLATE 5 MG/1
TABLET ORAL
Qty: 30 TABLET | Refills: 11 | Status: SHIPPED | OUTPATIENT
Start: 2019-12-03 | End: 2020-06-04 | Stop reason: DRUGHIGH

## 2019-12-03 RX ORDER — METFORMIN HCL 500 MG
TABLET, EXTENDED RELEASE 24 HR ORAL
Qty: 60 TABLET | Refills: 4 | Status: SHIPPED | OUTPATIENT
Start: 2019-12-03 | End: 2020-03-06

## 2019-12-03 NOTE — TELEPHONE ENCOUNTER
"Requested Prescriptions   Pending Prescriptions Disp Refills     amLODIPine (NORVASC) 5 MG tablet [Pharmacy Med Name: AMLODIPINE BESYLATE 5 MG TAB] 30 tablet 11     Sig: TAKE 1 TABLET BY MOUTH EVERY DAY       Calcium Channel Blockers Protocol  Passed - 12/2/2019  2:57 AM        Passed - Blood pressure under 140/90 in past 12 months     BP Readings from Last 3 Encounters:   11/19/19 124/80   11/05/18 132/82   09/19/17 122/80                 Passed - Recent (12 mo) or future (30 days) visit within the authorizing provider's specialty     Patient has had an office visit with the authorizing provider or a provider within the authorizing providers department within the previous 12 mos or has a future within next 30 days. See \"Patient Info\" tab in inbasket, or \"Choose Columns\" in Meds & Orders section of the refill encounter.              Passed - Medication is active on med list        Passed - Patient is age 18 or older        Passed - No active pregnancy on record        Passed - Normal serum creatinine on file in past 12 months     Recent Labs   Lab Test 10/21/19  0735   CR 0.72             Passed - No positive pregnancy test in past 12 months        Last Written Prescription Date:  11/15/2019  Last Fill Quantity: 30,  # refills: 11   Last office visit: 11/19/2019 with prescribing provider:  11/19/2019   Future Office Visit:      "

## 2019-12-03 NOTE — TELEPHONE ENCOUNTER
"Requested Prescriptions   Pending Prescriptions Disp Refills     metFORMIN (GLUCOPHAGE-XR) 500 MG 24 hr tablet [Pharmacy Med Name: METFORMIN HCL  MG TABLET] 60 tablet 11     Sig: TAKE 2 TABLETS (1,000 MG) BY MOUTH DAILY       Biguanide Agents Passed - 12/3/2019  2:28 AM        Passed - Blood pressure less than 140/90 in past 6 months     BP Readings from Last 3 Encounters:   11/19/19 124/80   11/05/18 132/82   09/19/17 122/80                 Passed - Patient has documented LDL within the past 12 mos.     Recent Labs   Lab Test 10/21/19  0735   LDL 57             Passed - Patient has had a Microalbumin in the past 15 mos.     Recent Labs   Lab Test 11/19/19  0727   MICROL 9   UMALCR 13.00             Passed - Patient is age 10 or older        Passed - Patient has documented A1c within the specified period of time.     If HgbA1C is 8 or greater, it needs to be on file within the past 3 months.  If less than 8, must be on file within the past 6 months.     Recent Labs   Lab Test 10/21/19  0735   A1C 7.3*             Passed - Patient's CR is NOT>1.4 OR Patient's EGFR is NOT<45 within past 12 mos.     Recent Labs   Lab Test 10/21/19  0735   GFRESTIMATED 88   GFRESTBLACK >90       Recent Labs   Lab Test 10/21/19  0735   CR 0.72             Passed - Patient does NOT have a diagnosis of CHF.        Passed - Medication is active on med list        Passed - Patient is not pregnant        Passed - Patient has not had a positive pregnancy test within the past 12 mos.         Passed - Recent (6 mo) or future (30 days) visit within the authorizing provider's specialty     Patient had office visit in the last 6 months or has a visit in the next 30 days with authorizing provider or within the authorizing provider's specialty.  See \"Patient Info\" tab in inbasket, or \"Choose Columns\" in Meds & Orders section of the refill encounter.              "

## 2020-02-03 DIAGNOSIS — I10 ESSENTIAL HYPERTENSION, BENIGN: ICD-10-CM

## 2020-02-04 NOTE — TELEPHONE ENCOUNTER
"Requested Prescriptions   Pending Prescriptions Disp Refills     losartan (COZAAR) 100 MG tablet  Last Written Prescription Date:  11/05/2019  Last Fill Quantity: 90,  # refills: 03   Last Office Visit: 11/19/2019   Future Office Visit:      90 tablet 3     Sig: Take 1 tablet (100 mg) by mouth daily       Angiotensin-II Receptors Passed - 2/3/2020  8:01 PM        Passed - Last blood pressure under 140/90 in past 12 months     BP Readings from Last 3 Encounters:   11/19/19 124/80   11/05/18 132/82   09/19/17 122/80                 Passed - Recent (12 mo) or future (30 days) visit within the authorizing provider's specialty     Patient has had an office visit with the authorizing provider or a provider within the authorizing providers department within the previous 12 mos or has a future within next 30 days. See \"Patient Info\" tab in inbasket, or \"Choose Columns\" in Meds & Orders section of the refill encounter.              Passed - Medication is active on med list        Passed - Patient is age 18 or older        Passed - No active pregnancy on record        Passed - Normal serum creatinine on file in past 12 months     Recent Labs   Lab Test 10/21/19  0735   CR 0.72             Passed - Normal serum potassium on file in past 12 months     Recent Labs   Lab Test 11/19/19  0753   POTASSIUM 3.7                    Passed - No positive pregnancy test in past 12 months          "

## 2020-02-05 RX ORDER — LOSARTAN POTASSIUM 100 MG/1
100 TABLET ORAL DAILY
Qty: 90 TABLET | Refills: 3 | Status: SHIPPED | OUTPATIENT
Start: 2020-02-05 | End: 2020-02-11

## 2020-02-05 NOTE — TELEPHONE ENCOUNTER
Prescription approved per [facility] Refill Protocol.    Magalys VAILN, RN, PHN

## 2020-02-11 ENCOUNTER — TELEPHONE (OUTPATIENT)
Dept: INTERNAL MEDICINE | Facility: CLINIC | Age: 65
End: 2020-02-11

## 2020-02-11 DIAGNOSIS — I10 ESSENTIAL HYPERTENSION, BENIGN: Primary | ICD-10-CM

## 2020-02-11 RX ORDER — IRBESARTAN 300 MG/1
300 TABLET ORAL AT BEDTIME
Qty: 90 TABLET | Refills: 3 | Status: SHIPPED | OUTPATIENT
Start: 2020-02-11 | End: 2020-05-18 | Stop reason: ALTCHOICE

## 2020-02-11 NOTE — TELEPHONE ENCOUNTER
Pt to stop Losartan and start Irbesartan 300mg tab, 1 tab daily instead for blood pressure control  Since Losartan not available per pharmacy. This new dose should be equivalent to Losartan 100mg tab dose. Rx sent to pharmacy. Inform pt

## 2020-03-06 ENCOUNTER — OFFICE VISIT (OUTPATIENT)
Dept: URGENT CARE | Facility: URGENT CARE | Age: 65
End: 2020-03-06
Payer: COMMERCIAL

## 2020-03-06 VITALS
OXYGEN SATURATION: 96 % | WEIGHT: 288 LBS | TEMPERATURE: 98.4 F | HEIGHT: 64 IN | BODY MASS INDEX: 49.17 KG/M2 | RESPIRATION RATE: 16 BRPM | DIASTOLIC BLOOD PRESSURE: 92 MMHG | HEART RATE: 68 BPM | SYSTOLIC BLOOD PRESSURE: 148 MMHG

## 2020-03-06 DIAGNOSIS — J20.9 ACUTE BRONCHITIS, UNSPECIFIED ORGANISM: Primary | ICD-10-CM

## 2020-03-06 DIAGNOSIS — R50.9 FEELS FEVERISH: ICD-10-CM

## 2020-03-06 LAB
FLUAV+FLUBV AG SPEC QL: NEGATIVE
FLUAV+FLUBV AG SPEC QL: NEGATIVE
SPECIMEN SOURCE: NORMAL

## 2020-03-06 PROCEDURE — 99213 OFFICE O/P EST LOW 20 MIN: CPT | Performed by: PHYSICIAN ASSISTANT

## 2020-03-06 PROCEDURE — 87804 INFLUENZA ASSAY W/OPTIC: CPT | Performed by: PHYSICIAN ASSISTANT

## 2020-03-06 RX ORDER — PREDNISONE 20 MG/1
20 TABLET ORAL 2 TIMES DAILY
Qty: 10 TABLET | Refills: 0 | Status: SHIPPED | OUTPATIENT
Start: 2020-03-06 | End: 2020-03-11

## 2020-03-06 RX ORDER — DEXTROMETHORPHAN HBR. AND GUAIFENESIN 10; 100 MG/5ML; MG/5ML
10 SOLUTION ORAL 4 TIMES DAILY
Qty: 237 ML | Refills: 0 | Status: SHIPPED | OUTPATIENT
Start: 2020-03-06 | End: 2020-05-18

## 2020-03-06 ASSESSMENT — MIFFLIN-ST. JEOR: SCORE: 1836.36

## 2020-03-06 NOTE — NURSING NOTE
"Vital signs:  Temp: 98.4  F (36.9  C) Temp src: Oral BP: (!) 148/92 Pulse: 68   Resp: 16 SpO2: 96 %     Height: 162.6 cm (5' 4\") Weight: 130.6 kg (288 lb)  Estimated body mass index is 49.44 kg/m  as calculated from the following:    Height as of this encounter: 1.626 m (5' 4\").    Weight as of this encounter: 130.6 kg (288 lb).        "

## 2020-03-06 NOTE — PROGRESS NOTES
SUBJECTIVE:   Kristen Moser is a 64 year old female presenting with a chief complaint of fever, chills, sweats and cough - non-productive.  Onset of symptoms was 2 day(s) ago.  Course of illness is worsening.    Severity moderate  Treatment measures tried include Tylenol/Ibuprofen, Fluids and Rest.  Predisposing factors include ill contact: Family member  and history of bronchitis.    Past Medical History:   Diagnosis Date     Colon polyps  May 2011     Tubular adenoma     Elevated sedimentation rate      Essential hypertension, benign      Hemangioma of other sites      Hyperlipidemia LDL goal < 100      Iron deficiency anemia, unspecified      Mild major depression (H)      DIXIE (obstructive sleep apnea)     not CPAP     Other specified disease of white blood cells      Postinflammatory pulmonary fibrosis (H)      Severe obesity (BMI 35.0-39.9) with comorbidity (H) 11/5/2018     Type 2 diabetes mellitus without complication (goal A1C<7) 10/17/2015     Unspecified breast disorder 11/04    cyst left breast s/p aspiration     Vitamin D deficiency 11/10/2012     Current Outpatient Medications   Medication Sig Dispense Refill     amLODIPine (NORVASC) 5 MG tablet TAKE 1 TABLET BY MOUTH EVERY DAY 30 tablet 11     ASPIRIN 81 MG OR TABS 1 tab po QD (Once per day) 100 3     blood glucose (NO BRAND SPECIFIED) test strip Use to test blood sugar 1 times daily or as directed. 100 strip 3     blood glucose monitoring (NO BRAND SPECIFIED) meter device kit Use to test blood sugar 1 times daily or as directed. 1 kit 0     calcium carbonate-vitamin D (CALCIUM 600+D) 600-200 MG-UNIT TABS Take 1 tablet by mouth 2 times daily       hydrochlorothiazide (HYDRODIURIL) 25 MG tablet Take 1 tablet (25 mg) by mouth daily 90 tablet 3     irbesartan (AVAPRO) 300 MG tablet Take 1 tablet (300 mg) by mouth At Bedtime 90 tablet 3     metFORMIN (GLUCOPHAGE-XR) 500 MG 24 hr tablet TAKE 4 TABLETS (1,000 MG) BY MOUTH DAILY (Patient taking differently:  TAKE 3 TABLETS (1,000 MG) BY MOUTH DAILY) 360 tablet 3     pravastatin (PRAVACHOL) 20 MG tablet Take 1 tablet (20 mg) by mouth daily 90 tablet 3     thin (NO BRAND SPECIFIED) lancets Use to test blood sugar 1 times daily or as directed. 100 each 3     Social History     Tobacco Use     Smoking status: Never Smoker     Smokeless tobacco: Never Used   Substance Use Topics     Alcohol use: No     Comment: 1 drink / month     Social History     Socioeconomic History     Marital status:      Spouse name: Not on file     Number of children: Not on file     Years of education: Not on file     Highest education level: Not on file   Occupational History     Not on file   Social Needs     Financial resource strain: Not on file     Food insecurity:     Worry: Not on file     Inability: Not on file     Transportation needs:     Medical: Not on file     Non-medical: Not on file   Tobacco Use     Smoking status: Never Smoker     Smokeless tobacco: Never Used   Substance and Sexual Activity     Alcohol use: No     Comment: 1 drink / month     Drug use: No     Sexual activity: Not Currently     Partners: Male     Birth control/protection: Surgical     Comment: tubal ligation   Lifestyle     Physical activity:     Days per week: Not on file     Minutes per session: Not on file     Stress: Not on file   Relationships     Social connections:     Talks on phone: Not on file     Gets together: Not on file     Attends Synagogue service: Not on file     Active member of club or organization: Not on file     Attends meetings of clubs or organizations: Not on file     Relationship status: Not on file     Intimate partner violence:     Fear of current or ex partner: Not on file     Emotionally abused: Not on file     Physically abused: Not on file     Forced sexual activity: Not on file   Other Topics Concern     Not on file   Social History Narrative     Not on file         ROS:  CONSTITUTIONAL:NEGATIVE for fever, chills, change in  "weight  INTEGUMENTARY/SKIN: NEGATIVE for worrisome rashes, moles or lesions  EYES: NEGATIVE for vision changes or irritation  ENT/MOUTH: See HPI  RESP:NEGATIVE for significant SOB  CV: NEGATIVE for chest pain, palpitations or peripheral edema  GI: NEGATIVE for nausea, abdominal pain, heartburn, or change in bowel habits  NEURO: NEGATIVE for weakness, dizziness or paresthesias  HEME/ALLERGY/IMMUNE: NEGATIVE for bleeding problems  PSYCHIATRIC: NEGATIVE for changes in mood or affect    OBJECTIVE:  BP (!) 148/92   Pulse 68   Temp 98.4  F (36.9  C) (Oral)   Resp 16   Ht 1.626 m (5' 4\")   Wt 130.6 kg (288 lb)   LMP 10/26/2006   SpO2 96%   BMI 49.44 kg/m    GENERAL APPEARANCE: healthy, alert and no distress  EYES: EOMI,  PERRL, conjunctiva clear  HENT: ear canals and TM's normal.  Mouth without ulcers, erythema or lesions. Nasal mucosa is erythematous and with mild clear mucus.   NECK: supple, nontender, no lymphadenopathy  RESP: Mild wheezing heard in the lower lung fields.   CV: regular rates and rhythm, normal S1 S2, no murmur noted  ABDOMEN:  soft, nontender, no HSM or masses and bowel sounds normal  NEURO: Normal strength and tone, sensory exam grossly normal,  normal speech and mentation  SKIN: no suspicious lesions or rashes    ASSESSMENT/PLAN:    Results for orders placed or performed in visit on 03/06/20   Influenza A/B antigen     Status: None   Result Value Ref Range    Influenza A/B Agn Specimen Nasal     Influenza A Negative NEG^Negative    Influenza B Negative NEG^Negative     (J20.9) Acute bronchitis, unspecified organism  (primary encounter diagnosis)  Plan: predniSONE (DELTASONE) 20 MG tablet,         dextromethorphan-guaiFENesin (TUSSIN DM)         MG/5ML liquid    (R50.9) Feels feverish  Plan: Influenza A/B antigen     Age 12 months or more  Okay to use Zarbee's   Okay to use Rx Children Tylenol if prescribed (Dose based on weight)    Age 2-12:   Okay to use Children Motrin or Tylenol over " the counter.    Adults:  Okay to take acetaminophen 500 mg- 2 tabs (Total of 1000 mg) every 8 hrs   Okay to take ibuprofen 200 mg- 3 tabs (Total of 600 mg) every 6 hours        Okay to use Neti pot for sinus lavage up to three times daily for congestion and sinus pressure if present. Daily hot shower can be beneficial for congestion and body aches. Okay to use bedroom vaporizer or humidifier if symptoms are worse at night. Nightly Vicks Vapor rub and 5-10 mg of Melatonin okay to use for sleep.     Over the counter cough medication and decongestants okay if not prescribed by me during this visit. For homeopathic alternatives to cough syrup and decongestant, feel free to try Elderberry extract.    Okay to use salt water gargles, warm tea (or warm water with lemon and honey), and lozenges for any throat discomfort. Chloraseptic spray is also highly encourages for throat pain/irritation.     Patient will need to get plenty of rest and drink at least 1.5-2 liters of fluids daily for adults and 1-1.5 liters for children. If vomiting and not tolerating liquids for more than 24 hrs, please go to your nearest emergency department for IV fluids and further treatment.     Patient is not contagious after 1 week from start of symptoms. If possible, wear mask for first 7 days. Wash hands regularly and vigorously for 30 seconds often.     Patient was advised to return to clinic if symptoms do not improve in the amount of time specified in the AVS or if symptoms worsen. Patient educated on red flag symptoms and asked to go directly to the ED if symptoms present themselves.     Stevie Gautam PA-C on 3/6/2020 at 9:37 AM

## 2020-03-06 NOTE — PATIENT INSTRUCTIONS
Patient Education     Acute Bronchitis  Your healthcare provider has told you that you have acute bronchitis. Bronchitis is infection or inflammation of the bronchial tubes (airways in the lungs). Normally, air moves easily in and out of the airways. Bronchitis narrows the airways, making it harder for air to flow in and out of the lungs. This causes symptoms such as shortness of breath, coughing up yellow or green mucus, and wheezing. Bronchitis can be acute or chronic. Acute means the condition comes on quickly and goes away in a short time, usually within 3 to 10 days. Chronic means a condition lasts a long time and often comes back.    What causes acute bronchitis?  Acute bronchitis almost always starts as a viral respiratory infection, such as a cold or the flu. Certain factors make it more likely for a cold or flu to turn into bronchitis. These include being very young, being elderly, having a heart or lung problem, or having a weak immune system. Cigarette smoking also makes bronchitis more likely.  When bronchitis develops, the airways become swollen. The airways may also become infected with bacteria. This is known as a secondary infection.  Diagnosing acute bronchitis  Your healthcare provider will examine you and ask about your symptoms and health history. You may also have a sputum culture to test the fluid in your lungs. Chest X-rays may be done to look for infection in the lungs.  Treating acute bronchitis  Bronchitis usually clears up as the cold or flu goes away. You can help feel better faster by doing the following:    Take medicine as directed. You may be told to take ibuprofen or other over-the-counter medicines. These help relieve inflammation in your bronchial tubes. Your healthcare provider may prescribe an inhaler to help open up the bronchial tubes. Most of the time, acute bronchitis is caused by a viral infection. Antibiotics are usually not prescribed for viral infections.    Drink  plenty of fluids, such as water, juice, or warm soup. Fluids loosen mucus so that you can cough it up. This helps you breathe more easily. Fluids also prevent dehydration.    Make sure you get plenty of rest.    Do not smoke. Do not allow anyone else to smoke in your home.  Recovery and follow-up  Follow up with your doctor as you are told. You will likely feel better in a week or two. But a dry cough can linger beyond that time. Let your doctor know if you still have symptoms (other than a dry cough) after 2 weeks, or if you re prone to getting bronchial infections. Take steps to protect yourself from future infections. These steps include stopping smoking and avoiding tobacco smoke, washing your hands often, and getting a yearly flu shot.  When to call your healthcare provider  Call the healthcare provider if you have any of the following:    Fever of 100.4 F (38.0 C) or higher, or as advised    Symptoms that get worse, or new symptoms    Trouble breathing    Symptoms that don t start to improve within a week, or within 3 days of taking antibiotics   Date Last Reviewed: 12/1/2016 2000-2019 [employer]. [address] [place] [address], [place] [address]. All rights reserved. This information is not intended as a substitute for professional medical care. Always follow your healthcare professional's instructions.

## 2020-05-13 ENCOUNTER — DOCUMENTATION ONLY (OUTPATIENT)
Dept: INTERNAL MEDICINE | Facility: CLINIC | Age: 65
End: 2020-05-13

## 2020-05-13 NOTE — PROGRESS NOTES
Chart reviewed by Ambulatory Quality and Data team    Please abstract the following data from this visit with this patient into the appropriate field in Epic:    Tests that can be patient reported without a hard copy:        Other Tests found in the patient's chart through Chart Review/Care Everywhere:    Eye exam with ophthalmology on this date: 5/13/2019 with documentation of no diabetic retinopathy.     Note to [employer]: If this section is blank, no results were found via Chart Review/Care Everywhere.

## 2020-05-15 DIAGNOSIS — I10 ESSENTIAL HYPERTENSION, BENIGN: ICD-10-CM

## 2020-05-18 RX ORDER — LOSARTAN POTASSIUM AND HYDROCHLOROTHIAZIDE 25; 100 MG/1; MG/1
TABLET ORAL
Qty: 90 TABLET | Refills: 0 | Status: SHIPPED | OUTPATIENT
Start: 2020-05-18 | End: 2020-06-04

## 2020-05-19 NOTE — TELEPHONE ENCOUNTER
" Pt to stop separate hydrochlorathiazide and Irbesartan and started Losartan /25mg tab, 1 tab daily for blood pressure. Rx done for 90 days. Pt overdue for f/u re\": diabetes mellitus. Pt to have nonfasting DM lab done in the next couple weeks and make virtual appt with me a few days later to review lab results and sugars. . Check blood sugars twice a day (before breakfast and bedtime) for 4 days prior to the appointment with me, write them down and have them available to review with the appointment. If pt doesn't have blood pressure monitor at home, then pt to also schedule nurse only In reviewing your chart in the electronic medical record it is clear that your blood pressure was not in control at your last visit.  In patients with diabetes it is recommended your BP should be under 140/90. Please come in to the clinic sometime in the next 2 weeks and have your BP taken.  If it is still not at goal we would recommend you see me to discuss further treatment. And have lab appt and BP check done at same [facility] appt. If has BP monitor at home, then check BP AM ansd PM for 3 days prior to appt with me and write down those numbers also to review. If does not need nurse only BP check, then cam do labs in clinic or [facility] lab    "

## 2020-05-20 ENCOUNTER — ALLIED HEALTH/NURSE VISIT (OUTPATIENT)
Dept: NURSING | Facility: CLINIC | Age: 65
End: 2020-05-20
Payer: COMMERCIAL

## 2020-05-20 VITALS — SYSTOLIC BLOOD PRESSURE: 145 MMHG | DIASTOLIC BLOOD PRESSURE: 77 MMHG | HEART RATE: 64 BPM

## 2020-05-20 DIAGNOSIS — E11.9 TYPE 2 DIABETES MELLITUS WITHOUT COMPLICATION, WITHOUT LONG-TERM CURRENT USE OF INSULIN (H): ICD-10-CM

## 2020-05-20 DIAGNOSIS — I10 ESSENTIAL HYPERTENSION, BENIGN: Primary | ICD-10-CM

## 2020-05-20 DIAGNOSIS — E78.5 HYPERLIPIDEMIA WITH TARGET LDL LESS THAN 100: ICD-10-CM

## 2020-05-20 LAB
ALBUMIN SERPL-MCNC: 3.3 G/DL (ref 3.4–5)
ALP SERPL-CCNC: 67 U/L (ref 40–150)
ALT SERPL W P-5'-P-CCNC: 34 U/L (ref 0–50)
ANION GAP SERPL CALCULATED.3IONS-SCNC: 5 MMOL/L (ref 3–14)
AST SERPL W P-5'-P-CCNC: 23 U/L (ref 0–45)
BILIRUB SERPL-MCNC: 0.4 MG/DL (ref 0.2–1.3)
BUN SERPL-MCNC: 14 MG/DL (ref 7–30)
CALCIUM SERPL-MCNC: 9 MG/DL (ref 8.5–10.1)
CHLORIDE SERPL-SCNC: 103 MMOL/L (ref 94–109)
CO2 SERPL-SCNC: 30 MMOL/L (ref 20–32)
CREAT SERPL-MCNC: 0.62 MG/DL (ref 0.52–1.04)
GFR SERPL CREATININE-BSD FRML MDRD: >90 ML/MIN/{1.73_M2}
GLUCOSE SERPL-MCNC: 175 MG/DL (ref 70–99)
HBA1C MFR BLD: 6.8 % (ref 0–5.6)
POTASSIUM SERPL-SCNC: 3.7 MMOL/L (ref 3.4–5.3)
PROT SERPL-MCNC: 8.2 G/DL (ref 6.8–8.8)
SODIUM SERPL-SCNC: 138 MMOL/L (ref 133–144)

## 2020-05-20 PROCEDURE — 36415 COLL VENOUS BLD VENIPUNCTURE: CPT | Performed by: INTERNAL MEDICINE

## 2020-05-20 PROCEDURE — 80053 COMPREHEN METABOLIC PANEL: CPT | Performed by: INTERNAL MEDICINE

## 2020-05-20 PROCEDURE — 99207 ZZC NO CHARGE NURSE ONLY: CPT

## 2020-05-20 PROCEDURE — 83036 HEMOGLOBIN GLYCOSYLATED A1C: CPT | Performed by: INTERNAL MEDICINE

## 2020-05-20 NOTE — PROGRESS NOTES
Patient consents to receive outdoor care: Yes    Upon arrival, patient instructed to proceed to designated location, place vehicle in park, turn off, and remove keys     If we are unable to safely and ergonomically able to provide care- is the patient able to safely able to get out of car and transfer to a chair? Yes    Chasity Ho MA  May 20, 2020

## 2020-06-04 ENCOUNTER — VIRTUAL VISIT (OUTPATIENT)
Dept: INTERNAL MEDICINE | Facility: CLINIC | Age: 65
End: 2020-06-04
Payer: COMMERCIAL

## 2020-06-04 VITALS — BODY MASS INDEX: 39.27 KG/M2 | WEIGHT: 230 LBS | HEIGHT: 64 IN

## 2020-06-04 DIAGNOSIS — E78.5 HYPERLIPIDEMIA WITH TARGET LDL LESS THAN 100: ICD-10-CM

## 2020-06-04 DIAGNOSIS — Z12.31 ENCOUNTER FOR SCREENING MAMMOGRAM FOR BREAST CANCER: Primary | ICD-10-CM

## 2020-06-04 DIAGNOSIS — Z23 NEED FOR PROPHYLACTIC VACCINATION AGAINST STREPTOCOCCUS PNEUMONIAE (PNEUMOCOCCUS): ICD-10-CM

## 2020-06-04 DIAGNOSIS — E11.9 TYPE 2 DIABETES MELLITUS WITHOUT COMPLICATION, WITHOUT LONG-TERM CURRENT USE OF INSULIN (H): ICD-10-CM

## 2020-06-04 DIAGNOSIS — I10 ESSENTIAL HYPERTENSION, BENIGN: ICD-10-CM

## 2020-06-04 PROCEDURE — 99214 OFFICE O/P EST MOD 30 MIN: CPT | Mod: 95 | Performed by: INTERNAL MEDICINE

## 2020-06-04 RX ORDER — AMLODIPINE BESYLATE 10 MG/1
10 TABLET ORAL DAILY
Qty: 90 TABLET | Refills: 2 | Status: SHIPPED | OUTPATIENT
Start: 2020-06-04 | End: 2021-05-28

## 2020-06-04 RX ORDER — PRAVASTATIN SODIUM 20 MG
20 TABLET ORAL DAILY
Qty: 90 TABLET | Refills: 2 | Status: SHIPPED | OUTPATIENT
Start: 2020-06-04 | End: 2021-05-28

## 2020-06-04 RX ORDER — METFORMIN HCL 500 MG
TABLET, EXTENDED RELEASE 24 HR ORAL
Qty: 270 TABLET | Refills: 2 | Status: SHIPPED | OUTPATIENT
Start: 2020-06-04 | End: 2020-11-13

## 2020-06-04 RX ORDER — LOSARTAN POTASSIUM AND HYDROCHLOROTHIAZIDE 25; 100 MG/1; MG/1
1 TABLET ORAL DAILY
Qty: 90 TABLET | Refills: 2 | Status: SHIPPED | OUTPATIENT
Start: 2020-06-04 | End: 2021-05-28

## 2020-06-04 ASSESSMENT — MIFFLIN-ST. JEOR: SCORE: 1573.27

## 2020-06-04 NOTE — PROGRESS NOTES
"Kristen Moser is a 64 year old female who is being evaluated via a billable telephone visit.      The patient has been notified of following:     \"This telephone visit will be conducted via a call between you and your physician/provider. We have found that certain health care needs can be provided without the need for a physical exam.  This service lets us provide the care you need with a short phone conversation.  If a prescription is necessary we can send it directly to your pharmacy.  If lab work is needed we can place an order for that and you can then stop by our lab to have the test done at a later time.    Telephone visits are billed at different rates depending on your insurance coverage. During this emergency period, for some insurers they may be billed the same as an in-person visit.  Please reach out to your insurance provider with any questions.    If during the course of the call the physician/provider feels a telephone visit is not appropriate, you will not be charged for this service.\"    Patient has given verbal consent for Telephone visit?  Yes    What phone number would you like to be contacted at? [phone]    How would you like to obtain your AVS? Mail a copy    Subjective     Kristen Moser is a 64 year old female who presents via phone visit today for the following health issues:    HPI  Diabetes Follow-up      How often are you checking your blood sugar? Not at all    What concerns do you have today about your diabetes? None     Do you have any of these symptoms? (Select all that apply)  No numbness or tingling in feet.  No redness, sores or blisters on feet.  No complaints of excessive thirst.  No reports of blurry vision.  No significant changes to weight.    Have you had a diabetic eye exam in the last 12 months? She is going to do one shortly        BP Readings from Last 2 Encounters:   05/20/20 (!) 145/77   03/06/20 (!) 148/92     Hemoglobin A1C (%)   Date Value   05/20/2020 6.8 (H) "   10/21/2019 7.3 (H)     LDL Cholesterol Calculated (mg/dL)   Date Value   10/21/2019 57   10/25/2018 50               Hypertension Follow-up      Do you check your blood pressure regularly outside of the clinic? No     Are you following a low salt diet? Yes    Are your blood pressures ever more than 140 on the top number (systolic) OR more   than 90 on the bottom number (diastolic), for example 140/90? No      How many servings of fruits and vegetables do you eat daily?  4 or more    On average, how many sweetened beverages do you drink each day (Examples: soda, juice, sweet tea, etc.  Do NOT count diet or artificially sweetened beverages)?   1    How many days per week do you exercise enough to make your heart beat faster? 4    How many minutes a day do you exercise enough to make your heart beat faster? 30 - 60    How many days per week do you miss taking your medication? 0       Due to the current impact of the Covid19 virus and recommendations by [facility] administration, CDC, etc to limit  clinic visits and pt exposure risk, was recommend pt proceed with virtual phone visit to address acute/stable  medical needs with plan to defer other non-acute health maintenance issues/exam to a future date when less self-isolation is required.    I have reviewed the nursing note as documented above.   See below for other information/data  and my personal notes capturing the substance of my conversation with the patient.       HPI:   Not checking sugars at home  Denies CP, SOB, abdominal pain, polyuria, polydipsia, vision changes, extremity numbness/parasthesias or skin problems. Denies edema issues  Due for eye exam  Recent BP check elevated  Weight down some  Patient really taking metformin XR 1500 mg daily.  Try to increase to 2000 g daily but had some GI side effects with that no issues with the 1500 mg daily dose         Component      Latest Ref Rng & Units 10/21/2019 11/19/2019 5/20/2020   Sodium      133 - 144 mmol/L 139   138   Potassium      3.4 - 5.3 mmol/L 3.3 (L)  3.7   Chloride      94 - 109 mmol/L 103  103   Carbon Dioxide      20 - 32 mmol/L 31  30   Anion Gap      3 - 14 mmol/L 5  5   Glucose      70 - 99 mg/dL 155 (H)  175 (H)   Urea Nitrogen      7 - 30 mg/dL 16  14   Creatinine      0.52 - 1.04 mg/dL 0.72  0.62   GFR Estimate      >60 mL/min/1.73:m2 88  >90   GFR Estimate If Black      >60 mL/min/1.73:m2 >90  >90   Calcium      8.5 - 10.1 mg/dL 8.8  9.0   Bilirubin Total      0.2 - 1.3 mg/dL 0.6  0.4   Albumin      3.4 - 5.0 g/dL 3.3 (L)  3.3 (L)   Protein Total      6.8 - 8.8 g/dL 7.4  8.2   Alkaline Phosphatase      40 - 150 U/L 69  67   ALT      0 - 50 U/L 30  34   AST      0 - 45 U/L 22  23   Cholesterol      <200 mg/dL 129     Triglycerides      <150 mg/dL 122     HDL Cholesterol      >49 mg/dL 48 (L)     LDL Cholesterol Calculated      <100 mg/dL 57     Non HDL Cholesterol      <130 mg/dL 81     TSH      0.40 - 4.00 mU/L 4.92 (H) 2.94    Hemoglobin A1C      0 - 5.6 % 7.3 (H)  6.8 (H)   T4 Free      0.76 - 1.46 ng/dL 1.06       Additional ROS:   Constitutional, HEENT, Cardiovascular, Pulmonary, GI and , Neuro, MSK and Psych review of systems/symptoms are otherwise negative or unchanged from previous, except as noted above.      ASSESSMENT:   1. Type 2 diabetes mellitus without complication, without long-term current use of insulin (H)  Controlled.  Continue medication.  Repeat labs 6 months  - metFORMIN (GLUCOPHAGE-XR) 500 MG 24 hr tablet; TAKE 3 TABLETS (1,000 MG) BY MOUTH DAILY  Dispense: 270 tablet; Refill: 2  - Hemoglobin A1c; Future  - Comprehensive metabolic panel; Future  - Lipid panel reflex to direct LDL Fasting; Future  - TSH with free T4 reflex; Future  - Albumin Random Urine Quantitative with Creat Ratio; Future    2. Essential hypertension, benign  Recent blood pressure elevated.  Increase amlodipine to 10 mg daily.  Continue other medications.  Will have nurse only blood pressure check in 3 weeks  -  amLODIPine (NORVASC) 10 MG tablet; Take 1 tablet (10 mg) by mouth daily  Dispense: 90 tablet; Refill: 2  - losartan-hydrochlorothiazide (HYZAAR) 100-25 MG tablet; Take 1 tablet by mouth daily  Dispense: 90 tablet; Refill: 2  - Comprehensive metabolic panel; Future    3. Hyperlipidemia with target LDL less than 100  Controlled.  Continue medication.  Future labs ordered  - pravastatin (PRAVACHOL) 20 MG tablet; Take 1 tablet (20 mg) by mouth daily  Dispense: 90 tablet; Refill: 2  - Comprehensive metabolic panel; Future  - Lipid panel reflex to direct LDL Fasting; Future    4. Encounter for screening mammogram for breast cancer  - *MA Screening Digital Bilateral; Future    5. Need for prophylactic vaccination against Streptococcus pneumoniae (pneumococcus)  - ADMIN MEDICARE: Pneumococcal Vaccine ()  - Pneumococcal vaccine 23 valent PPSV23  (Pneumovax) [03691]; Future      PLAN:  Increase Amlodipine to 5mg tab, 2 tabs (10mg) daily until run out. Then change to 10mg tab, 1 tab daily. New prescription put on file at pharmacy  Continue other meds.  Prescriptions refilled.    Schedule nurse only appointment for later this month  Call  [phone] or use [facility] to schedule a future lab appointment  fasting in 6 months (bradley;y December).   For fasting labs, please refrain from eating for 8 hours or more.   Drink 2 glasses of water before your lab appointment. It is fine to take your  oral medications on the morning of the lab test as usual  Follow up with me a few days after these future labs are drawn to review results and other medical issues as necessary  Mammogram   at [facility] after 10/22/20  Vaccinations: Pneumococcal 23 vaccine - get later this month when have the nurse only appointment for blood pressure recheck. Will be done at the same time. ASk the schedulers to both  Have an appointment for a vaccination and blood pressure check  Schedule an eye appointment this year        Phone call contact time  Call  Started at 9:12am  Call Ended at 9:24am  Total minutes: 12 min    (Chart documentation was completed, in part, with [employer] voice-recognition software. Even though reviewed, some grammatical, spelling, and word errors may remain.)    Radames Reed MD  Internal Medicine Department  [facility]

## 2020-06-04 NOTE — PATIENT INSTRUCTIONS
Increase Amlodipine to 5mg tab, 2 tabs (10mg) daily until run out. Then change to 10mg tab, 1 tab daily. New prescription put on file at pharmacy  Continue other meds.  Prescriptions refilled.    Schedule nurse only appointment for later this month  Call  [phone] or use [facility] to schedule a future lab appointment  fasting in 6 months (bradley;y December).   For fasting labs, please refrain from eating for 8 hours or more.   Drink 2 glasses of water before your lab appointment. It is fine to take your  oral medications on the morning of the lab test as usual  Follow up with me a few days after these future labs are drawn to review results and other medical issues as necessary  Mammogram   at [facility] after 10/22/20  Vaccinations: Pneumococcal 23 vaccine - get later this month when have the nurse only appointment for blood pressure recheck. Will be done at the same time. ASk the schedulers to both  Have an appointment for a vaccination and blood pressure check  Schedule an eye appointment this year

## 2020-07-02 ENCOUNTER — DOCUMENTATION ONLY (OUTPATIENT)
Dept: INTERNAL MEDICINE | Facility: CLINIC | Age: 65
End: 2020-07-02

## 2020-07-02 NOTE — PROGRESS NOTES
Please place or confirm orders for upcoming lab appointment on 07/09/2020. THANK YOU.    LABS ARE NOT EXPECTED UNTIL 12/01/2020. IS IT OK TO DRAW LABD NOW? IF NOT PLEASE ADVISE PATIENT HAVE PATIENT CANCEL LAB APPOINTMENT. THANK YOU.

## 2020-07-09 ENCOUNTER — APPOINTMENT (OUTPATIENT)
Dept: LAB | Facility: CLINIC | Age: 65
End: 2020-07-09
Payer: COMMERCIAL

## 2020-07-09 ENCOUNTER — ALLIED HEALTH/NURSE VISIT (OUTPATIENT)
Dept: NURSING | Facility: CLINIC | Age: 65
End: 2020-07-09
Payer: COMMERCIAL

## 2020-07-09 VITALS — DIASTOLIC BLOOD PRESSURE: 73 MMHG | HEART RATE: 69 BPM | SYSTOLIC BLOOD PRESSURE: 136 MMHG

## 2020-07-09 DIAGNOSIS — Z01.30 BLOOD PRESSURE CHECK: Primary | ICD-10-CM

## 2020-07-09 PROCEDURE — 99207 ZZC NO CHARGE NURSE ONLY: CPT

## 2020-07-09 NOTE — PROGRESS NOTES
I met with Kristen Moser at the request of  to recheck her blood pressure.  Blood pressure medications on the med list were reviewed with patient.      Patient has taken all medications as per usual regimen: yes  Patient reports tolerating them without any issues or concerns: yes    Vitals:    07/09/20 0855 07/09/20 0905   BP: (!) 140/69 136/73   BP Location: Right arm Right arm   Patient Position: Sitting Sitting   Cuff Size: Adult Large Adult Large   Pulse: 64 69       After 5 minutes, the patient's blood pressure was <140/90, the previous encounter was reviewed, recorded blood pressure below 140/90. Patient was discharged and the note will be sent to the provider for final review.

## 2020-09-02 ENCOUNTER — TRANSFERRED RECORDS (OUTPATIENT)
Dept: HEALTH INFORMATION MANAGEMENT | Facility: CLINIC | Age: 65
End: 2020-09-02

## 2020-09-02 LAB — RETINOPATHY: NEGATIVE

## 2020-10-06 ENCOUNTER — TELEPHONE (OUTPATIENT)
Dept: INTERNAL MEDICINE | Facility: CLINIC | Age: 65
End: 2020-10-06

## 2020-10-06 NOTE — TELEPHONE ENCOUNTER
Reason for Call:  Other call back    Detailed comments: Pt had a phone visit with Dr Reed on 6/4/20.  At that visit, Dr Reed suggested that the pt get a shot, but the pt can't remember which shot.  Please call pt back to inform her of which shot.    Phone Number Patient can be reached at: Home number on file [phone] (home)    Best Time: anytime    Can we leave a detailed message on this number? YES    Call taken on 10/6/2020 at 10:32 AM by MELISSA FINK

## 2020-11-11 ENCOUNTER — ANCILLARY PROCEDURE (OUTPATIENT)
Dept: MAMMOGRAPHY | Facility: CLINIC | Age: 65
End: 2020-11-11
Payer: COMMERCIAL

## 2020-11-11 DIAGNOSIS — Z12.31 VISIT FOR SCREENING MAMMOGRAM: ICD-10-CM

## 2020-11-11 PROCEDURE — 77063 BREAST TOMOSYNTHESIS BI: CPT | Performed by: RADIOLOGY

## 2020-11-11 PROCEDURE — 77067 SCR MAMMO BI INCL CAD: CPT | Performed by: RADIOLOGY

## 2020-11-13 DIAGNOSIS — E11.9 TYPE 2 DIABETES MELLITUS WITHOUT COMPLICATION, WITHOUT LONG-TERM CURRENT USE OF INSULIN (H): ICD-10-CM

## 2020-11-13 RX ORDER — METFORMIN HCL 500 MG
TABLET, EXTENDED RELEASE 24 HR ORAL
Qty: 360 TABLET | Refills: 0 | Status: SHIPPED | OUTPATIENT
Start: 2020-11-13 | End: 2021-05-25

## 2020-11-24 ENCOUNTER — VIRTUAL VISIT (OUTPATIENT)
Dept: FAMILY MEDICINE | Facility: OTHER | Age: 65
End: 2020-11-24
Payer: COMMERCIAL

## 2020-11-24 DIAGNOSIS — Z20.822 SUSPECTED 2019 NOVEL CORONAVIRUS INFECTION: Primary | ICD-10-CM

## 2020-11-24 DIAGNOSIS — Z20.822 SUSPECTED 2019 NOVEL CORONAVIRUS INFECTION: ICD-10-CM

## 2020-11-24 PROCEDURE — U0003 INFECTIOUS AGENT DETECTION BY NUCLEIC ACID (DNA OR RNA); SEVERE ACUTE RESPIRATORY SYNDROME CORONAVIRUS 2 (SARS-COV-2) (CORONAVIRUS DISEASE [COVID-19]), AMPLIFIED PROBE TECHNIQUE, MAKING USE OF HIGH THROUGHPUT TECHNOLOGIES AS DESCRIBED BY CMS-2020-01-R: HCPCS | Performed by: FAMILY MEDICINE

## 2020-11-24 PROCEDURE — 99421 OL DIG E/M SVC 5-10 MIN: CPT | Performed by: NURSE PRACTITIONER

## 2020-11-24 NOTE — PROGRESS NOTES
"Date: 2020 11:57:40  Clinician: Cora Combs  Clinician NPI: [id number]  Patient: satish mora  Patient : 1955  Patient Address: [address] [place] [id number]  Patient Phone: [phone]  Visit Protocol: URI  Patient Summary:  satish is a 65 year old ( : 1955 ) female who initiated a [facility] Visit for COVID-19 (Coronavirus) evaluation and screening. When asked the question \"Please sign me up to receive news, health information and promotions from [facility].\", satish responded \"Yes\".    When asked when her symptoms started, satish reported that she does not have any symptoms.   She denies taking antibiotic medication in the past month and having recent facial or sinus surgery in the past 60 days.    Pertinent COVID-19 (Coronavirus) information  satish does not work or volunteer as healthcare worker or a . In the past 14 days, satish has not worked or volunteered at a healthcare facility or group living setting.   In the past 14 days, she also has not lived in a congregate living setting.   satsih has had a close contact with a laboratory-confirmed COVID-19 patient in the last 14 days. She was not exposed at her work. She does not know when she was exposed to the laboratory-confirmed COVID-19 patient.   Additional information about contact with COVID-19 (Coronavirus) patient as reported by the patient (free text): My friend gave me a ride home   satish is not living in the same household with the COVID-19 positive patient. She was in an enclosed space for greater than 15 minutes with the COVID-19 patient.   During the encounter, neither were wearing masks.   Since 2019, satish has not been tested for COVID-19 and has had upper respiratory infection (URI) or influenza-like illness.      Date(s) of previous URI or influenza-like illness (free-text): Bronchitis in 2020     Symptoms satish experienced during previous URI or influenza-like illness as reported by the patient " (free-text): Cough, fever        Pertinent medical history  satish does not get yeast infections when she takes antibiotics.   satish does not need a return to work/school note.   Weight: 230 lbs   satish does not smoke or use smokeless tobacco.   Weight: 230 lbs    MEDICATIONS: losartan-hydrochlorothiazide oral, pravastatin oral, amlodipine besylate (bulk), metformin oral, ALLERGIES: NKDA  Clinician Response:  Dear satish,   Based on your exposure to COVID-19 (coronavirus), we would like to test you for this virus.  1. Please call [phone] to schedule your visit. Explain that you were referred by [facility] to have a COVID-19 test. Be ready to share your [facility] visit ID number.  * If you need to schedule in [facility] please call [phone] or for [employer] [facility] employees please call [phone].   * If you need to schedule in the [place] area please call [phone]. [place] employees call [phone].   The following will serve as your written order for this COVID Test, ordered by me, for the indication of suspected COVID [Z20.828]: The test will be ordered in [facility], our electronic health record, after you are scheduled. It will show as ordered and authorized by Matteo Connelly MD.  Order: COVID-19 (coronavirus) PCR for ASYMPTOMATIC EXPOSURE testing from [facility].   If you know you have had close contact with someone who tested positive, you should be quarantined for 14 days after this exposure. You should stay in quarantine for the14 days even if the covid test is negative, the optimal time to test after exposure is 5-7 days from the exposure  Quarantine means   What should I do?  For safety, it's very important to follow these rules. Do this for 14 days after the date you were last exposed to the virus..  Stay home and away from others. Don't go to school or anywhere else. Generally quarantine means staying home from work but there are some exceptions to this. Please contact your workplace.   No hugging, kissing or shaking  hands.  Don't let anyone visit.  Cover your mouth and nose with a mask, tissue or washcloth to avoid spreading germs.  Wash your hands and face often. Use soap and water.  What are the symptoms of COVID-19?  The most common symptoms are cough, fever and trouble breathing. Less common symptoms include headache, body aches, fatigue (feeling very tired), chills, sore throat, stuffy or runny nose, diarrhea (loose poop), loss of taste or smell, belly pain, and nausea or vomiting (feeling sick to your stomach or throwing up).  After 14 days, if you have still don't have symptoms, you likely don't have this virus.  If you develop symptoms, follow these guidelines.  If you're normally healthy: Please start another [facility] visit to report your symptoms. Go to [email or web address].  If you have a serious health problem (like cancer, heart failure, an organ transplant or kidney disease): Call your specialty clinic. Let them know that you might have COVID-19.  2. When it's time for your COVID test:  Stay at least 6 feet away from others. (If someone will drive you to your test, stay in the backseat, as far away from the  as you can.)  Cover your mouth and nose with a mask, tissue or washcloth.  Go straight to the testing site. Don't make any stops on the way there or back.  Please note  Caregivers in these groups are at risk for severe illness due to COVID-19:  o People 65 years and older  o People who live in a nursing home or long-term care facility  o People with chronic disease (lung, heart, cancer, diabetes, kidney, liver, immunologic)  o People who have a weakened immune system, including those who:  Are in cancer treatment  Take medicine that weakens the immune system, such as corticosteroids  Had a bone marrow or organ transplant  Have an immune deficiency  Have poorly controlled HIV or AIDS  Are obese (body mass index of 40 or higher)  Smoke regularly  Where can I get more information?   [facility] -- About COVID-19:  [email or web address]  CDC -- What to Do If You're Sick: www.cdc.gov/coronavirus/2019-ncov/about/steps-when-sick.html  CDC -- Ending Home Isolation: www.cdc.gov/coronavirus/2019-ncov/hcp/disposition-in-home-patients.html  CDC -- Caring for Someone: www.cdc.gov/coronavirus/2019-ncov/if-you-are-sick/care-for-someone.html  [facility] -- Interim Guidance for Hospital Discharge to Home: www.health.[facility].[place]./diseases/coronavirus/hcp/hospdischarge.pdf  [facility] clinical trials (COVID-19 research studies): clinicalaffairs.[facility]-clinical-trials  Below are the COVID-19 hotlines at the [place] Department of Health ([facility]). Interpreters are available.  For health questions: Call 203-491-9986 or 1-735.696.3228 (7 a.m. to 7 p.m.)  For questions about schools and childcare: Call 844-950-4703 or 1-689.197.6929 (7 a.m. to 7 p.m.)    COVID-19 (Coronavirus) General Information  Because there is currently no vaccine to prevent infection, the best way to protect yourself is to avoid being exposed to this virus. Common symptoms of COVID-19 include but are not limited to fever, cough, and shortness of breath. These symptoms appear 2-14 days after you are exposed to the virus that causes COVID-19. Click here for more information from the CDC on how to protect yourself.  If you are sick with COVID-19 or suspect you are infected with the virus that causes COVID-19, follow the steps here from the CDC to help prevent the disease from spreading to people in your home and community.  Click here for general information from the CDC on testing.  If you develop any of these emergency warning signs for COVID-19, get medical attention immediately:     Trouble breathing    Persistent pain or pressure in the chest    New confusion or inability to arouse    Bluish lips or face      Call your doctor or clinic before going in. Call [phone] if you have a medical emergency and notify the  you have or think you may have  COVID-19.  For more detailed and up to date information on COVID-19 (Coronavirus), please visit the CDC website.   Diagnosis: Contact with and (suspected) exposure to other viral communicable diseases  Diagnosis ICD: Z20.828

## 2020-11-25 LAB
SARS-COV-2 RNA SPEC QL NAA+PROBE: NOT DETECTED
SPECIMEN SOURCE: NORMAL

## 2020-12-22 DIAGNOSIS — E78.5 HYPERLIPIDEMIA WITH TARGET LDL LESS THAN 100: ICD-10-CM

## 2020-12-22 DIAGNOSIS — E11.9 TYPE 2 DIABETES MELLITUS WITHOUT COMPLICATION, WITHOUT LONG-TERM CURRENT USE OF INSULIN (H): ICD-10-CM

## 2020-12-22 DIAGNOSIS — I10 ESSENTIAL HYPERTENSION, BENIGN: ICD-10-CM

## 2020-12-22 LAB
ALBUMIN SERPL-MCNC: 3.4 G/DL (ref 3.4–5)
ALP SERPL-CCNC: 74 U/L (ref 40–150)
ALT SERPL W P-5'-P-CCNC: 35 U/L (ref 0–50)
ANION GAP SERPL CALCULATED.3IONS-SCNC: 6 MMOL/L (ref 3–14)
AST SERPL W P-5'-P-CCNC: 28 U/L (ref 0–45)
BILIRUB SERPL-MCNC: 0.7 MG/DL (ref 0.2–1.3)
BUN SERPL-MCNC: 14 MG/DL (ref 7–30)
CALCIUM SERPL-MCNC: 9.5 MG/DL (ref 8.5–10.1)
CHLORIDE SERPL-SCNC: 103 MMOL/L (ref 94–109)
CHOLEST SERPL-MCNC: 142 MG/DL
CO2 SERPL-SCNC: 29 MMOL/L (ref 20–32)
CREAT SERPL-MCNC: 0.81 MG/DL (ref 0.52–1.04)
CREAT UR-MCNC: 127 MG/DL
GFR SERPL CREATININE-BSD FRML MDRD: 75 ML/MIN/{1.73_M2}
GLUCOSE SERPL-MCNC: 139 MG/DL (ref 70–99)
HBA1C MFR BLD: 6.6 % (ref 0–5.6)
HDLC SERPL-MCNC: 55 MG/DL
LDLC SERPL CALC-MCNC: 64 MG/DL
MICROALBUMIN UR-MCNC: 16 MG/L
MICROALBUMIN/CREAT UR: 12.44 MG/G CR (ref 0–25)
NONHDLC SERPL-MCNC: 87 MG/DL
POTASSIUM SERPL-SCNC: 4.2 MMOL/L (ref 3.4–5.3)
PROT SERPL-MCNC: 8 G/DL (ref 6.8–8.8)
SODIUM SERPL-SCNC: 138 MMOL/L (ref 133–144)
TRIGL SERPL-MCNC: 115 MG/DL
TSH SERPL DL<=0.005 MIU/L-ACNC: 3.3 MU/L (ref 0.4–4)

## 2020-12-22 PROCEDURE — 80053 COMPREHEN METABOLIC PANEL: CPT | Performed by: INTERNAL MEDICINE

## 2020-12-22 PROCEDURE — 82043 UR ALBUMIN QUANTITATIVE: CPT | Performed by: INTERNAL MEDICINE

## 2020-12-22 PROCEDURE — 80061 LIPID PANEL: CPT | Performed by: INTERNAL MEDICINE

## 2020-12-22 PROCEDURE — 83036 HEMOGLOBIN GLYCOSYLATED A1C: CPT | Performed by: INTERNAL MEDICINE

## 2020-12-22 PROCEDURE — 84443 ASSAY THYROID STIM HORMONE: CPT | Performed by: INTERNAL MEDICINE

## 2020-12-22 PROCEDURE — 36415 COLL VENOUS BLD VENIPUNCTURE: CPT | Performed by: INTERNAL MEDICINE

## 2021-01-15 ENCOUNTER — HEALTH MAINTENANCE LETTER (OUTPATIENT)
Age: 66
End: 2021-01-15

## 2021-03-30 ENCOUNTER — OFFICE VISIT (OUTPATIENT)
Dept: URGENT CARE | Facility: URGENT CARE | Age: 66
End: 2021-03-30
Payer: COMMERCIAL

## 2021-03-30 ENCOUNTER — ANCILLARY PROCEDURE (OUTPATIENT)
Dept: GENERAL RADIOLOGY | Facility: CLINIC | Age: 66
End: 2021-03-30
Attending: PHYSICIAN ASSISTANT
Payer: COMMERCIAL

## 2021-03-30 VITALS
HEART RATE: 66 BPM | BODY MASS INDEX: 38.45 KG/M2 | RESPIRATION RATE: 20 BRPM | TEMPERATURE: 98 F | OXYGEN SATURATION: 97 % | SYSTOLIC BLOOD PRESSURE: 128 MMHG | DIASTOLIC BLOOD PRESSURE: 68 MMHG | WEIGHT: 224 LBS

## 2021-03-30 DIAGNOSIS — R05.9 COUGH: Primary | ICD-10-CM

## 2021-03-30 DIAGNOSIS — R50.9 FEVER AND CHILLS: ICD-10-CM

## 2021-03-30 LAB
ANION GAP SERPL CALCULATED.3IONS-SCNC: <1 MMOL/L (ref 3–14)
BASOPHILS # BLD AUTO: 0 10E9/L (ref 0–0.2)
BASOPHILS NFR BLD AUTO: 0.3 %
BUN SERPL-MCNC: 17 MG/DL (ref 7–30)
CALCIUM SERPL-MCNC: 9.9 MG/DL (ref 8.5–10.1)
CHLORIDE SERPL-SCNC: 103 MMOL/L (ref 94–109)
CK SERPL-CCNC: 37 U/L (ref 30–225)
CO2 SERPL-SCNC: 34 MMOL/L (ref 20–32)
CREAT SERPL-MCNC: 0.81 MG/DL (ref 0.52–1.04)
DIFFERENTIAL METHOD BLD: ABNORMAL
EOSINOPHIL # BLD AUTO: 0.1 10E9/L (ref 0–0.7)
EOSINOPHIL NFR BLD AUTO: 1.1 %
ERYTHROCYTE [DISTWIDTH] IN BLOOD BY AUTOMATED COUNT: 15.5 % (ref 10–15)
ERYTHROCYTE [SEDIMENTATION RATE] IN BLOOD BY WESTERGREN METHOD: 34 MM/H (ref 0–30)
GFR SERPL CREATININE-BSD FRML MDRD: 76 ML/MIN/{1.73_M2}
GLUCOSE SERPL-MCNC: 107 MG/DL (ref 70–99)
HCT VFR BLD AUTO: 40 % (ref 35–47)
HGB BLD-MCNC: 12.4 G/DL (ref 11.7–15.7)
LYMPHOCYTES # BLD AUTO: 1.4 10E9/L (ref 0.8–5.3)
LYMPHOCYTES NFR BLD AUTO: 22.3 %
MCH RBC QN AUTO: 25.5 PG (ref 26.5–33)
MCHC RBC AUTO-ENTMCNC: 31 G/DL (ref 31.5–36.5)
MCV RBC AUTO: 82 FL (ref 78–100)
MONOCYTES # BLD AUTO: 0.7 10E9/L (ref 0–1.3)
MONOCYTES NFR BLD AUTO: 11.1 %
NEUTROPHILS # BLD AUTO: 4.2 10E9/L (ref 1.6–8.3)
NEUTROPHILS NFR BLD AUTO: 65.2 %
PLATELET # BLD AUTO: 315 10E9/L (ref 150–450)
POTASSIUM SERPL-SCNC: 3.7 MMOL/L (ref 3.4–5.3)
RBC # BLD AUTO: 4.86 10E12/L (ref 3.8–5.2)
SARS-COV-2 RNA RESP QL NAA+PROBE: NORMAL
SODIUM SERPL-SCNC: 137 MMOL/L (ref 133–144)
SPECIMEN SOURCE: NORMAL
WBC # BLD AUTO: 6.5 10E9/L (ref 4–11)

## 2021-03-30 PROCEDURE — 36415 COLL VENOUS BLD VENIPUNCTURE: CPT | Performed by: PHYSICIAN ASSISTANT

## 2021-03-30 PROCEDURE — 85652 RBC SED RATE AUTOMATED: CPT | Performed by: PHYSICIAN ASSISTANT

## 2021-03-30 PROCEDURE — 99214 OFFICE O/P EST MOD 30 MIN: CPT | Performed by: PHYSICIAN ASSISTANT

## 2021-03-30 PROCEDURE — 71046 X-RAY EXAM CHEST 2 VIEWS: CPT | Performed by: RADIOLOGY

## 2021-03-30 PROCEDURE — 82550 ASSAY OF CK (CPK): CPT | Performed by: PHYSICIAN ASSISTANT

## 2021-03-30 PROCEDURE — 85025 COMPLETE CBC W/AUTO DIFF WBC: CPT | Performed by: PHYSICIAN ASSISTANT

## 2021-03-30 PROCEDURE — U0005 INFEC AGEN DETEC AMPLI PROBE: HCPCS | Performed by: FAMILY MEDICINE

## 2021-03-30 PROCEDURE — U0003 INFECTIOUS AGENT DETECTION BY NUCLEIC ACID (DNA OR RNA); SEVERE ACUTE RESPIRATORY SYNDROME CORONAVIRUS 2 (SARS-COV-2) (CORONAVIRUS DISEASE [COVID-19]), AMPLIFIED PROBE TECHNIQUE, MAKING USE OF HIGH THROUGHPUT TECHNOLOGIES AS DESCRIBED BY CMS-2020-01-R: HCPCS | Performed by: FAMILY MEDICINE

## 2021-03-30 PROCEDURE — 80048 BASIC METABOLIC PNL TOTAL CA: CPT | Performed by: PHYSICIAN ASSISTANT

## 2021-03-30 NOTE — PROGRESS NOTES
"Patient presents with:  Cough: with some chest \"tightness\" for about 2 days. Fever last night    (R05) Cough  (primary encounter diagnosis)  Comment:   Plan: Symptomatic COVID-19 Virus (Coronavirus) by         PCR, CBC with platelets and differential, Basic        metabolic panel  (Ca, Cl, CO2, Creat, Gluc, K,         Na, BUN), ESR: Erythrocyte sedimentation rate,         CK total, XR Chest 2 Views    Consider trying over the counter claritin, as post nasal drip will also trigger a cough.    Tylenol or ibuprofen as needed     Follow up should symptoms persist or worsen.      Follow covid isolation guidelines        SUBJECTIVE:   Kristen Moser is a 65 year old female with chest tightness/soreness with cough last night and fever of 100 last night.  Cough for 3 days.  Had second Covid vaccine on 3/26/21.    Denies shortness of breath or wheezing.      Slight tickle in throat.  Denies any significant nasal congestion.    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      Denies any ill contacts.      Does have a history of bronchitis per patient.  Has robitussin with codeine at home to use as needed.      Past Medical History:   Diagnosis Date     Colon polyps  May 2011     Tubular adenoma     Elevated sedimentation rate      Essential hypertension, benign      Hemangioma of other sites      Hyperlipidemia LDL goal < 100      Iron deficiency anemia, unspecified      Mild major depression (H)      DIXIE (obstructive sleep apnea)     not CPAP     Other specified disease of white blood cells      Postinflammatory pulmonary fibrosis (H)      Severe obesity (BMI 35.0-39.9) with comorbidity (H) 11/5/2018     Type 2 diabetes mellitus without complication (goal A1C<7) 10/17/2015     Unspecified breast disorder 11/04    cyst left breast s/p aspiration     Vitamin D deficiency 11/10/2012         Current Outpatient Medications   Medication Sig Dispense Refill     Multiple Vitamins-Iron (DAILY-STONEY/IRON/BETA-CAROTENE) " TABS TAKE 1 TABLET BY MOUTH DAILY. (Patient not taking: Reported on 10/19/2020) 30 tablet 7     Social History     Tobacco Use     Smoking status: Never Smoker     Smokeless tobacco: Never Used   Substance Use Topics     Alcohol use: Not on file     Family History   Problem Relation Age of Onset     Diabetes Mother      Diabetes Father          ROS:    10 point ROS of systems including Constitutional, Eyes, Respiratory, Cardiovascular, Gastroenterology, Genitourinary, Integumentary, Muscularskeletal, Psychiatric ,neurological were all negative except for pertinent positives noted in my HPI       OBJECTIVE:  /68   Pulse 66   Temp 98  F (36.7  C)   Resp 20   Wt 101.6 kg (224 lb)   LMP 10/26/2006   SpO2 97%   BMI 38.45 kg/m    Physical Exam:  GENERAL APPEARANCE: healthy, alert and no distress  EYES: EOMI,  PERRL, conjunctiva clear  HENT: ear canals and TM's normal.  Nose and mouth without ulcers, erythema or lesions  HENT: nasal turbinates erythematous, swollen and rhinorrhea white  NECK: supple, nontender, no lymphadenopathy  RESP: lungs clear to auscultation - no rales, rhonchi or wheezes  CV: regular rates and rhythm, normal S1 S2, no murmur noted  ABDOMEN:  soft, nontender, no HSM or masses and bowel sounds normal  NEURO: Normal strength and tone, sensory exam grossly normal,  normal speech and mentation  SKIN: no suspicious lesions or rashes    X-Ray was done, my findings are: no infiltrates or masses

## 2021-03-30 NOTE — PATIENT INSTRUCTIONS
"(R05) Cough  (primary encounter diagnosis)  Comment:   Plan: Symptomatic COVID-19 Virus (Coronavirus) by         PCR, CBC with platelets and differential, Basic        metabolic panel  (Ca, Cl, CO2, Creat, Gluc, K,         Na, BUN), ESR: Erythrocyte sedimentation rate,         CK total, XR Chest 2 Views    Consider trying over the counter claritin, as post nasal drip will also trigger a cough.    Tylenol or ibuprofen as needed     Follow up should symptoms persist or worsen.      Follow covid isolation guidelines            (R50.9) Fever and chills  Comment:   Plan: XR Chest 2 Views                Patient Education   After Your COVID-19 (Coronavirus) Test  You have been tested for COVID-19 (coronavirus).   If you'll have surgery in the next few days, we'll let you know ahead of time if you have the virus. Please call your surgeon's office with any questions.  For all other patients: Results are usually available in [employer] within 2 to 3 days.   If you do not have a [employer] account, you'll get a letter in the mail in about 7 to 10 days.   [employer] is often the fastest way to get test results. Please sign up if you do not already have a [employer] account. See the handout Getting COVID-19 Test Results in [employer] for help.  What if my test result is positive?  If your test is positive and you have not viewed your result in [employer], you'll get a phone call with your result. (A positive test means that you have the virus.)     Follow the tips under \"How do I self-isolate?\" below for 10 days (20 days if you have a weak immune system).    You don't need to be retested for COVID-19 before going back to school or work. As long as you're fever-free and feeling better, you can go back to school, work and other activities after waiting the 10 or 20 days.  What if I have questions after I get my results?  If you have questions about your results, please visit our testing website at [email or web address]. " "  After 7 to 10 days, if you have not gotten your results:     Call [phone] (8-124-KFYNAYCP) and ask to speak with our COVID-19 results team.    If you're being treated at an infusion center: Call your infusion center directly.  What are the symptoms of COVID-19?  Cough, fever and trouble breathing are the most common signs of COVID-19.  Other symptoms can include new headaches, new muscle or body aches, new and unexplained fatigue (feeling very tired), chills, sore throat, congestion (stuffy or runny nose), diarrhea (loose poop), loss of taste or smell, belly pain, and nausea or vomiting (feeling sick to your stomach or throwing up).  You may already have symptoms of COVID-19, or they may show up later.  What should I do if I have symptoms?  If you're having surgery: Call your surgeon's office.  For all other patients: Stay home and away from others (self-isolate) until ...    You've had no fever--and no medicine that reduces fever--for 1 full day (24 hours), AND    Other symptoms have gotten better. For example, your cough or breathing has improved, AND    At least 10 days have passed since your symptoms first started.  How do I self-isolate?    Stay in your own room, even for meals. Use your own bathroom if you can.    Stay away from others in your home. No hugging, kissing or shaking hands. No visitors.    Don't go to work, school or anywhere else.    Clean \"high touch\" surfaces often (doorknobs, counters, handles). Use household cleaning spray or wipes. You'll find a full list of  on the EPA website: www.epa.gov/pesticide-registration/list-n-disinfectants-use-against-sars-cov-2.    Cover your mouth and nose with a mask or other face covering to avoid spreading germs.    Wash your hands and face often. Use soap and water.    Caregivers in these groups are at risk for severe illness due to COVID-19:  ? People 65 years and older  ? People who live in a nursing home or long-term care " facility  ? People with chronic disease (lung, heart, cancer, diabetes, kidney, liver, immunologic)  ? People who have a weakened immune system, including those who:    Are in cancer treatment    Take medicine that weakens the immune system, such as corticosteroids    Had a bone marrow or organ transplant    Have an immune deficiency    Have poorly controlled HIV or AIDS    Are obese (body mass index of 40 or higher)    Smoke regularly    Caregivers should wear gloves while washing dishes, handling laundry and cleaning bedrooms and bathrooms.    Use caution when washing and drying laundry: Don't shake dirty laundry and use the warmest water setting that you can.    For more tips on managing your health at home, go to www.cdc.gov/coronavirus/2019-ncov/downloads/10Things.pdf.  How can I take care of myself at home?  1. Get lots of rest. Drink extra fluids (unless a doctor has told you not to).  2. Take Tylenol (acetaminophen) for fever or pain. If you have liver or kidney problems, ask your family doctor if it's OK to take Tylenol.   Adults can take either:  ? 650 mg (two 325 mg pills) every 4 to 6 hours, or   ? 1,000 mg (two 500 mg pills) every 8 hours as needed.  ? Note: Don't take more than 3,000 mg in one day. Acetaminophen is found in many medicines (both prescribed and over-the-counter medicines). Read all labels to be sure you don't take too much.   For children, check the Tylenol bottle for the right dose. The dose is based on the child's age or weight.  3. If you have other health problems (like cancer, heart failure, an organ transplant or severe kidney disease): Call your specialty clinic if you don't feel better in the next 2 days.  4. Know when to call 911. Emergency warning signs include:  ? Trouble breathing or shortness of breath  ? Chest pain or pressure that doesn't go away  ? Feeling confused like you haven't felt before, or not being able to wake up  ? Bluish-colored lips or face  5. If your doctor  prescribed a blood thinner medicine: Follow their instructions.  Where can I get more information?    [facility] - About COVID-19:   [email or web address]    CDC - If You're Sick: cdc.gov/coronavirus/2019-ncov/about/steps-when-sick.html    CDC - Ending Home Isolation: www.cdc.gov/coronavirus/2019-ncov/hcp/disposition-in-home-patients.html    CDC - Caring for Someone: www.cdc.gov/coronavirus/2019-ncov/if-you-are-sick/care-for-someone.html    [facility] - Interim Guidance for Hospital Discharge to Home: www.health.[facility].mn.us/diseases/coronavirus/hcp/hospdischarge.pdf    [facility] clinical trials (COVID-19 research studies): clinicalaffairs.[facility]-clinical-trials    Below are the COVID-19 hotlines at the [place] Department of Health ([facility]). Interpreters are available.  ? For health questions: Call 422-892-1909 or 1-231.956.9944 (7 a.m. to 7 p.m.)  ? For questions about schools and childcare: Call 774-053-0901 or 1-835.960.1117 (7 a.m. to 7 p.m.)    For informational purposes only. Not to replace the advice of your health care provider. Clinically reviewed by Infection Prevention and the [facility] COVID-19 Clinical Team. Copyright   2020 [facility]. All rights reserved. [employer] [id number] - Rev 11/11/20.

## 2021-03-31 LAB
LABORATORY COMMENT REPORT: ABNORMAL
SARS-COV-2 RNA RESP QL NAA+PROBE: POSITIVE
SPECIMEN SOURCE: ABNORMAL

## 2021-05-03 ENCOUNTER — APPOINTMENT (OUTPATIENT)
Dept: CT IMAGING | Facility: CLINIC | Age: 66
End: 2021-05-03
Attending: EMERGENCY MEDICINE
Payer: COMMERCIAL

## 2021-05-03 ENCOUNTER — HOSPITAL ENCOUNTER (EMERGENCY)
Facility: CLINIC | Age: 66
Discharge: HOME | End: 2021-05-03
Attending: EMERGENCY MEDICINE | Admitting: EMERGENCY MEDICINE
Payer: COMMERCIAL

## 2021-05-03 ENCOUNTER — APPOINTMENT (OUTPATIENT)
Dept: ULTRASOUND IMAGING | Facility: CLINIC | Age: 66
End: 2021-05-03
Attending: EMERGENCY MEDICINE
Payer: COMMERCIAL

## 2021-05-03 VITALS
RESPIRATION RATE: 16 BRPM | SYSTOLIC BLOOD PRESSURE: 143 MMHG | HEART RATE: 68 BPM | DIASTOLIC BLOOD PRESSURE: 69 MMHG | OXYGEN SATURATION: 97 % | TEMPERATURE: 99.2 F

## 2021-05-03 DIAGNOSIS — I88.0 MESENTERIC ADENITIS: ICD-10-CM

## 2021-05-03 DIAGNOSIS — R59.0 ABDOMINAL LYMPHADENOPATHY: ICD-10-CM

## 2021-05-03 LAB
ALBUMIN SERPL-MCNC: 3.6 G/DL (ref 3.4–5)
ALBUMIN UR-MCNC: 10 MG/DL
ALP SERPL-CCNC: 79 U/L (ref 40–150)
ALT SERPL W P-5'-P-CCNC: 34 U/L (ref 0–50)
ANION GAP SERPL CALCULATED.3IONS-SCNC: 3 MMOL/L (ref 3–14)
APPEARANCE UR: ABNORMAL
AST SERPL W P-5'-P-CCNC: 23 U/L (ref 0–45)
BACTERIA #/AREA URNS HPF: ABNORMAL /HPF
BASOPHILS # BLD AUTO: 0.1 10E9/L (ref 0–0.2)
BASOPHILS NFR BLD AUTO: 0.5 %
BILIRUB DIRECT SERPL-MCNC: 0.2 MG/DL (ref 0–0.2)
BILIRUB SERPL-MCNC: 0.7 MG/DL (ref 0.2–1.3)
BILIRUB UR QL STRIP: NEGATIVE
BUN SERPL-MCNC: 17 MG/DL (ref 7–30)
CALCIUM SERPL-MCNC: 9.3 MG/DL (ref 8.5–10.1)
CHLORIDE SERPL-SCNC: 104 MMOL/L (ref 94–109)
CO2 SERPL-SCNC: 30 MMOL/L (ref 20–32)
COLOR UR AUTO: YELLOW
CREAT SERPL-MCNC: 0.76 MG/DL (ref 0.52–1.04)
DIFFERENTIAL METHOD BLD: ABNORMAL
EOSINOPHIL # BLD AUTO: 0.1 10E9/L (ref 0–0.7)
EOSINOPHIL NFR BLD AUTO: 1.2 %
ERYTHROCYTE [DISTWIDTH] IN BLOOD BY AUTOMATED COUNT: 14.9 % (ref 10–15)
GFR SERPL CREATININE-BSD FRML MDRD: 81 ML/MIN/{1.73_M2}
GLUCOSE SERPL-MCNC: 125 MG/DL (ref 70–99)
GLUCOSE UR STRIP-MCNC: >1000 MG/DL
HCT VFR BLD AUTO: 40.1 % (ref 35–47)
HGB BLD-MCNC: 12.7 G/DL (ref 11.7–15.7)
HGB UR QL STRIP: NEGATIVE
IMM GRANULOCYTES # BLD: 0.1 10E9/L (ref 0–0.4)
IMM GRANULOCYTES NFR BLD: 0.5 %
KETONES UR STRIP-MCNC: ABNORMAL MG/DL
LEUKOCYTE ESTERASE UR QL STRIP: NEGATIVE
LIPASE SERPL-CCNC: 143 U/L (ref 73–393)
LYMPHOCYTES # BLD AUTO: 2.6 10E9/L (ref 0.8–5.3)
LYMPHOCYTES NFR BLD AUTO: 22.2 %
MCH RBC QN AUTO: 26 PG (ref 26.5–33)
MCHC RBC AUTO-ENTMCNC: 31.7 G/DL (ref 31.5–36.5)
MCV RBC AUTO: 82 FL (ref 78–100)
MONOCYTES # BLD AUTO: 0.8 10E9/L (ref 0–1.3)
MONOCYTES NFR BLD AUTO: 6.5 %
MUCOUS THREADS #/AREA URNS LPF: PRESENT /LPF
NEUTROPHILS # BLD AUTO: 8 10E9/L (ref 1.6–8.3)
NEUTROPHILS NFR BLD AUTO: 69.1 %
NITRATE UR QL: POSITIVE
NRBC # BLD AUTO: 0 10*3/UL
NRBC BLD AUTO-RTO: 0 /100
PH UR STRIP: 5.5 PH (ref 5–7)
PLATELET # BLD AUTO: 408 10E9/L (ref 150–450)
POTASSIUM SERPL-SCNC: 3.4 MMOL/L (ref 3.4–5.3)
PROT SERPL-MCNC: 8.5 G/DL (ref 6.8–8.8)
RBC # BLD AUTO: 4.88 10E12/L (ref 3.8–5.2)
RBC #/AREA URNS AUTO: 1 /HPF (ref 0–2)
SODIUM SERPL-SCNC: 137 MMOL/L (ref 133–144)
SOURCE: ABNORMAL
SP GR UR STRIP: 1.02 (ref 1–1.03)
SQUAMOUS #/AREA URNS AUTO: 1 /HPF (ref 0–1)
UROBILINOGEN UR STRIP-MCNC: NORMAL MG/DL (ref 0–2)
WBC # BLD AUTO: 11.6 10E9/L (ref 4–11)
WBC #/AREA URNS AUTO: 3 /HPF (ref 0–5)

## 2021-05-03 PROCEDURE — 250N000009 HC RX 250: Performed by: EMERGENCY MEDICINE

## 2021-05-03 PROCEDURE — 80048 BASIC METABOLIC PNL TOTAL CA: CPT | Performed by: EMERGENCY MEDICINE

## 2021-05-03 PROCEDURE — 99285 EMERGENCY DEPT VISIT HI MDM: CPT | Mod: 25

## 2021-05-03 PROCEDURE — 83690 ASSAY OF LIPASE: CPT | Performed by: EMERGENCY MEDICINE

## 2021-05-03 PROCEDURE — 250N000011 HC RX IP 250 OP 636: Performed by: EMERGENCY MEDICINE

## 2021-05-03 PROCEDURE — 80076 HEPATIC FUNCTION PANEL: CPT | Performed by: EMERGENCY MEDICINE

## 2021-05-03 PROCEDURE — 81001 URINALYSIS AUTO W/SCOPE: CPT | Performed by: EMERGENCY MEDICINE

## 2021-05-03 PROCEDURE — 74177 CT ABD & PELVIS W/CONTRAST: CPT

## 2021-05-03 PROCEDURE — 76705 ECHO EXAM OF ABDOMEN: CPT

## 2021-05-03 PROCEDURE — 85025 COMPLETE CBC W/AUTO DIFF WBC: CPT | Performed by: EMERGENCY MEDICINE

## 2021-05-03 RX ORDER — IOPAMIDOL 755 MG/ML
500 INJECTION, SOLUTION INTRAVASCULAR ONCE
Status: COMPLETED | OUTPATIENT
Start: 2021-05-03 | End: 2021-05-03

## 2021-05-03 RX ORDER — HYDROCODONE BITARTRATE AND ACETAMINOPHEN 5; 325 MG/1; MG/1
1 TABLET ORAL EVERY 6 HOURS PRN
Qty: 10 TABLET | Refills: 0 | Status: SHIPPED | OUTPATIENT
Start: 2021-05-03 | End: 2021-05-06

## 2021-05-03 RX ORDER — LEVOFLOXACIN 500 MG/1
500 TABLET, FILM COATED ORAL DAILY
Qty: 10 TABLET | Refills: 0 | Status: SHIPPED | OUTPATIENT
Start: 2021-05-03 | End: 2021-05-13

## 2021-05-03 RX ADMIN — SODIUM CHLORIDE 65 ML: 9 INJECTION, SOLUTION INTRAVENOUS at 16:24

## 2021-05-03 RX ADMIN — IOPAMIDOL 100 ML: 755 INJECTION, SOLUTION INTRAVENOUS at 16:24

## 2021-05-03 ASSESSMENT — ENCOUNTER SYMPTOMS
VOMITING: 0
NAUSEA: 0
ABDOMINAL PAIN: 1
HEMATURIA: 0
APPETITE CHANGE: 0
DYSURIA: 0

## 2021-05-03 NOTE — ED PROVIDER NOTES
History   Chief Complaint:  Abdominal Pain     HPI   Kristen Moser is a 65 year old female with history of diverticulitis, type 2 diabetes and hypertension who presents with abdominal pain. For the past three days the patient started experiencing right sided abdominal pain. She has been able to eat, but she states that she is unable to lay on her left side due to pain. She notes a subjective fever, but denies any nausea, vomiting, dysuria or hematuria.    Review of Systems   Constitutional: Negative for appetite change.   Gastrointestinal: Positive for abdominal pain. Negative for nausea and vomiting.   Genitourinary: Negative for dysuria and hematuria.   All other systems reviewed and are negative.    Allergies:  Lisinopril    Medications:  Norvasc  Caclium  Hyzaar  Metformin  Pravastatin    Past Medical History:    Colon polyps  Hypertension  Hemangioma of other sites  Hyperlipidemia  Iron deficiency anemia  Mild major depression  DIXIE  Postinflammatory pulmonary fibrosis  Severe obesity  Type 2 diabetes  Breast disorder  Vitamin D deficiency  Hypokalemia  Osteoarthritis  Diverticulitis    Past Surgical History:    Tubal ligation  Breast cysts    Family History:    Father: Hypertension  Mother: Kidney disease    Social History:  The patient was unaccompanied to the ED.    Physical Exam     Patient Vitals for the past 24 hrs:   BP Temp Temp src Pulse Resp SpO2   05/03/21 1600 -- -- -- -- -- 98 %   05/03/21 1545 (!) 169/81 99.2  F (37.3  C) Oral 72 -- 97 %   05/03/21 1226 (!) 171/85 99.9  F (37.7  C) Oral 73 16 97 %       Physical Exam  General: Patient is alert and interactive when I enter the room  Head:  The scalp, face, and head appear normal  Eyes:  Conjunctivae are normal  ENT:    The nose is normal    Pinnae are normal    External acoustic canals are normal  Neck:  Trachea midline  CV:  Pulses are normal    Resp:  No respiratory distress   Abdomen:      Soft, RUQ tenderness non-distended  Musc:  Normal  muscular tone    No major joint effusions    No asymmetric leg swelling    Good capillary refill noted  Skin:  No rash or lesions noted  Neuro:  Speech is normal and fluent. Face is symmetric.     Moving all extremities well.   Psych: Awake. Alert.  Normal affect.  Appropriate interactions.    Emergency Department Course   Imaging:  CT Abdomen Pelvis w Contrast  1.  Trace infrahepatic ascites and mild edema, similar to the remote  exam from 2015. This could reflect a mild nonspecific mesenteritis. No  adjacent inflammatory bowel wall thickening.  2.  Mildly enlarged upper abdominal lymph nodes which are favored to  be reactive. As a precaution, suggest 6 month CT follow-up.  3.  Multiple chronic findings as described above, similar to the prior  exam.  Reading per radiology    Abdomen US, limited (RUQ only)  1.  No acute findings. Normal gallbladder and bile ducts. No  cholelithiasis or acute cholecystitis. No biliary ductal dilatation.  2.  Lateral left hepatic lobe lesion, stable in size since the CT from  2015, previously characterized as a hemangioma.  3.  Coarsened hepatic echotexture likely reflecting steatosis.  Reading per radiology    Laboratory:  UA with Microscopic: Glucose >1000 (A), Ketones Trace (A), Protein Albumin 10 (A), Nitrite Positive (A), Bacteria Many (A), Mucous Urine Present (A) o/w Negative    CBC: WBC 11.6 (H), HGB 12.7,   BMP: Glucose 125 (H) o/w WNL (Creatinine 0.76)    Hepatic Panel: WNL    Lipase: 143    Emergency Department Course:  Reviewed:  I reviewed nursing notes, vitals, past medical history and care everywhere    Assessments:  1518 I obtained history and examined the patient as noted above.     1746 I rechecked and updated the patient regarding the imaging results, laboratory results and the plan for care.    Disposition:  The patient was discharged to home.     Impression & Plan   Medical Decision Making:  Kristen Moser is a 65 year old female with history of diabetes  who presents with right upper quadrant abdominal pain.  She is focally tender in her right upper quadrant.  Vitals are unremarkable.  CBC in the remaining of her blood work including LFTs and lipase are unremarkable.  We did do an ultrasound which revealed no cholelithiasis.  Given her occult tenderness on exam we did do a CT abdomen pelvis.  Oddly this showed the same changes as in 2015 when she was diagnosed with diverticulitis.  It seems like the diagnosed her with probable diverticulitis but it could have been colitis.  Regardless she improved with antibiotics.  She has trace ascites and edema which is similar to the 2015.  This could be a chronic issue for her but given her focal pain we will start her on antibiotics.  She has no dysuria and I think her urine is likely not a true infection.  Patient felt comfort this plan.  I think patient should follow-up with GI discussed the changes on her CAT scan as it is not clear the source of her inflammation.  Patient felt comfortable this plan.  Return precautions given.  Patient discharged.    Diagnosis:    ICD-10-CM    1. Mesenteric adenitis  I88.0    2. Abdominal lymphadenopathy  R59.0        Discharge Medications:  New Prescriptions    HYDROCODONE-ACETAMINOPHEN (NORCO) 5-325 MG TABLET    Take 1 tablet by mouth every 6 hours as needed    LEVOFLOXACIN (LEVAQUIN) 500 MG TABLET    Take 1 tablet (500 mg) by mouth daily for 10 days       Scribe Disclosure:  I, Ivan Alanis, am serving as a scribe at 3:15 PM on 5/3/2021 to document services personally performed by Edie Orr MD based on my observations and the provider's statements to me.      Edie Orr MD  05/03/21 [id number]

## 2021-05-03 NOTE — DISCHARGE INSTRUCTIONS
Follow-up with GI. Take antibiotics.     Have an enlarged abdominal lymph node, radiology recommends a repeat scan in 6 months as a precaution

## 2021-05-22 DIAGNOSIS — E11.9 TYPE 2 DIABETES MELLITUS WITHOUT COMPLICATION, WITHOUT LONG-TERM CURRENT USE OF INSULIN (H): ICD-10-CM

## 2021-05-22 NOTE — LETTER
[facility]  [address] [address]  [phone]      5/26/2021       Kristen Moser  [address] [address]        Dear Kristen,  While refilling your prescription today, we noticed that you are due for a follow up appointment and non-fasting labs with Dr. Reed. We will refill your prescription for 90 days. Please call to schedule to a non-fasting lab appointment with in the next month (June) along with a separate follow up appointment with Dr. Reed a few days after labs are complete to review those results and address other medical issues as needed    Taking care of your health is important to us and we look forward to seeing you in the near future.  Please call us at [phone] or [phone] (or use [employer]) to schedule an appointment.     Please disregard this notice if you have already made an appointment.        Sincerely,      Radames Reed MD/Susanna Tinsley, [facility]  Internal Medicine

## 2021-05-23 DIAGNOSIS — I10 ESSENTIAL HYPERTENSION, BENIGN: ICD-10-CM

## 2021-05-23 DIAGNOSIS — E78.5 HYPERLIPIDEMIA WITH TARGET LDL LESS THAN 100: ICD-10-CM

## 2021-05-25 RX ORDER — METFORMIN HCL 500 MG
TABLET, EXTENDED RELEASE 24 HR ORAL
Qty: 360 TABLET | Refills: 0 | Status: SHIPPED | OUTPATIENT
Start: 2021-05-25 | End: 2021-07-09

## 2021-05-25 NOTE — TELEPHONE ENCOUNTER
6/4/20 last Visit with Dr. Reed.    Hemoglobin A1C   Date Value Ref Range Status   12/22/2020 6.6 (H) 0 - 5.6 % Final     Comment:     Normal <5.7% Prediabetes 5.7-6.4%  Diabetes 6.5% or higher - adopted from ADA   consensus guidelines.       Not RN protocol.    Please refill as appropriate.    Fifi Wells RN  [facility]

## 2021-05-26 NOTE — TELEPHONE ENCOUNTER
Pt last seen 1 year ago. Also due for f/u nonfasting labs. Pt to have nonfasting lab appt in the next month followed by appt with me in clinic a few days later to review blood pressure status, DM control and other medical issues. Inform pt and assist with scheduling appts. Med refilled for 90 days

## 2021-05-28 RX ORDER — PRAVASTATIN SODIUM 20 MG
20 TABLET ORAL DAILY
Qty: 30 TABLET | Refills: 0 | Status: SHIPPED | OUTPATIENT
Start: 2021-05-28 | End: 2021-07-07

## 2021-05-28 RX ORDER — AMLODIPINE BESYLATE 10 MG/1
10 TABLET ORAL DAILY
Qty: 30 TABLET | Refills: 0 | Status: SHIPPED | OUTPATIENT
Start: 2021-05-28 | End: 2021-07-07

## 2021-05-28 RX ORDER — LOSARTAN POTASSIUM AND HYDROCHLOROTHIAZIDE 25; 100 MG/1; MG/1
1 TABLET ORAL DAILY
Qty: 30 TABLET | Refills: 0 | Status: SHIPPED | OUTPATIENT
Start: 2021-05-28 | End: 2021-07-07

## 2021-05-28 NOTE — TELEPHONE ENCOUNTER
Medication is being filled for 1 time refill only due to:  Patient needs to be seen because it has been more than one year since last visit.

## 2021-06-08 DIAGNOSIS — E11.9 TYPE 2 DIABETES MELLITUS WITHOUT COMPLICATION, WITHOUT LONG-TERM CURRENT USE OF INSULIN (H): ICD-10-CM

## 2021-06-08 LAB
ANION GAP SERPL CALCULATED.3IONS-SCNC: 3 MMOL/L (ref 3–14)
BUN SERPL-MCNC: 15 MG/DL (ref 7–30)
CALCIUM SERPL-MCNC: 8.9 MG/DL (ref 8.5–10.1)
CHLORIDE SERPL-SCNC: 102 MMOL/L (ref 94–109)
CO2 SERPL-SCNC: 32 MMOL/L (ref 20–32)
CREAT SERPL-MCNC: 0.79 MG/DL (ref 0.52–1.04)
GFR SERPL CREATININE-BSD FRML MDRD: 78 ML/MIN/{1.73_M2}
GLUCOSE SERPL-MCNC: 155 MG/DL (ref 70–99)
HBA1C MFR BLD: 6.8 % (ref 0–5.6)
POTASSIUM SERPL-SCNC: 3.5 MMOL/L (ref 3.4–5.3)
SODIUM SERPL-SCNC: 137 MMOL/L (ref 133–144)

## 2021-06-08 PROCEDURE — 80048 BASIC METABOLIC PNL TOTAL CA: CPT | Performed by: INTERNAL MEDICINE

## 2021-06-08 PROCEDURE — 83036 HEMOGLOBIN GLYCOSYLATED A1C: CPT | Performed by: INTERNAL MEDICINE

## 2021-06-08 PROCEDURE — 36415 COLL VENOUS BLD VENIPUNCTURE: CPT | Performed by: INTERNAL MEDICINE

## 2021-06-23 DIAGNOSIS — I10 ESSENTIAL HYPERTENSION, BENIGN: ICD-10-CM

## 2021-06-23 DIAGNOSIS — E78.5 HYPERLIPIDEMIA WITH TARGET LDL LESS THAN 100: ICD-10-CM

## 2021-06-23 NOTE — TELEPHONE ENCOUNTER
Losartan-hydrochlorothiazide  Routing refill request to provider for review/approval because:  Patient needs to be seen because it has been more than 1 year since last office visit.  BP not at goal       Amlodipine  Routing refill request to provider for review/approval because:  Patient needs to be seen because it has been more than 1 year since last office visit.  BP not at goal       Pravastatin  Routing refill request to provider for review/approval because:  Patient needs to be seen because it has been more than 1 year since last office visit.      BP Readings from Last 3 Encounters:   05/03/21 (!) 143/69   03/30/21 128/68   07/09/20 136/73

## 2021-06-24 NOTE — TELEPHONE ENCOUNTER
Patient last had a virtual visit with me 1 year ago.  Last physically seen by me in 2019.  Was sent a letter 1 month ago instructing her of need for labs along with an appointment with me in clinic.  Patient had labs done on 6/8/2021 and was scheduled to see me on 6/10/2021 but patient canceled that appointment and no future appointment was scheduled.  Being asked to refill patient's blood pressure medication I have no documentation regarding her blood pressure status at this time.  Call patient and get an appointment scheduled with me in the next month.  After appointment scheduled, then route refill request back to me to address and prescriptions will be refilled for 30 days to cover until patient seen in clinic

## 2021-06-30 ENCOUNTER — MYC REFILL (OUTPATIENT)
Dept: INTERNAL MEDICINE | Facility: CLINIC | Age: 66
End: 2021-06-30

## 2021-06-30 DIAGNOSIS — E78.5 HYPERLIPIDEMIA WITH TARGET LDL LESS THAN 100: ICD-10-CM

## 2021-06-30 DIAGNOSIS — I10 ESSENTIAL HYPERTENSION, BENIGN: ICD-10-CM

## 2021-07-01 NOTE — TELEPHONE ENCOUNTER
Pravastatin   Routing refill request to provider for review/approval because:  Patient needs to be seen because it has been more than 1 year since last office visit.      Amlodipine  Routing refill request to provider for review/approval because:  Patient needs to be seen because it has been more than 1 year since last office visit.  BP not at goal       Losartan-hydrochlorothiazide  Routing refill request to provider for review/approval because:  Patient needs to be seen because it has been more than 1 year since last office visit.  BP not at goal     BP Readings from Last 3 Encounters:   05/03/21 (!) 143/69   03/30/21 128/68   07/09/20 136/73

## 2021-07-02 NOTE — TELEPHONE ENCOUNTER
See refill request from 5/23/2021.  Instructed that patient had not been seen in over a year at that time.  Patient then had medications refilled after appointment was scheduled for 6/10/2021 but then patient canceled that appointment and no future appointment was scheduled.  Call patient and get appointment rescheduled with me within the next 30 days.  Then route refill request back to me to address. If patient cancels appointment again without being seen, will then not refill medications until patient is physically seen back in clinic

## 2021-07-06 DIAGNOSIS — E11.9 TYPE 2 DIABETES MELLITUS WITHOUT COMPLICATION, WITHOUT LONG-TERM CURRENT USE OF INSULIN (H): ICD-10-CM

## 2021-07-06 RX ORDER — METFORMIN HCL 500 MG
TABLET, EXTENDED RELEASE 24 HR ORAL
Qty: 360 TABLET | Refills: 1 | OUTPATIENT
Start: 2021-07-06

## 2021-07-06 NOTE — TELEPHONE ENCOUNTER
Routing refill request to provider for review/approval because:  Drug not on the [facility] refill protocol , patient needs to be seen for annual visit. I will send My Chart reminder. She is leaving on vacation and needs the refill.     Valencia Velez RN  [facility] Triage

## 2021-07-06 NOTE — TELEPHONE ENCOUNTER
See my message with refill request from 6/30/2021.  Patient has hypertension along with diabetes and hyperlipidemia.  Had a telephone visit in June 2020.  I have not seen the patient regarding blood pressure status since November 2019.  Patient needs an appointment soon as I have previously written.  Call patient and get   appointment scheduled that I have requested.   Metformin was refilled in May for 90 days so patient should have enough medication to cover her until she is seen

## 2021-07-07 RX ORDER — PRAVASTATIN SODIUM 20 MG
20 TABLET ORAL DAILY
Qty: 30 TABLET | Refills: 0 | OUTPATIENT
Start: 2021-07-07

## 2021-07-07 RX ORDER — AMLODIPINE BESYLATE 10 MG/1
10 TABLET ORAL DAILY
Qty: 30 TABLET | Refills: 0 | OUTPATIENT
Start: 2021-07-07

## 2021-07-07 RX ORDER — PRAVASTATIN SODIUM 20 MG
20 TABLET ORAL DAILY
Qty: 30 TABLET | Refills: 0 | Status: SHIPPED | OUTPATIENT
Start: 2021-07-07 | End: 2021-07-09

## 2021-07-07 RX ORDER — LOSARTAN POTASSIUM AND HYDROCHLOROTHIAZIDE 25; 100 MG/1; MG/1
1 TABLET ORAL DAILY
Qty: 30 TABLET | Refills: 0 | OUTPATIENT
Start: 2021-07-07

## 2021-07-07 RX ORDER — LOSARTAN POTASSIUM AND HYDROCHLOROTHIAZIDE 25; 100 MG/1; MG/1
1 TABLET ORAL DAILY
Qty: 30 TABLET | Refills: 0 | Status: SHIPPED | OUTPATIENT
Start: 2021-07-07 | End: 2021-07-09

## 2021-07-07 RX ORDER — AMLODIPINE BESYLATE 10 MG/1
10 TABLET ORAL DAILY
Qty: 30 TABLET | Refills: 0 | Status: SHIPPED | OUTPATIENT
Start: 2021-07-07 | End: 2021-07-09

## 2021-07-09 ENCOUNTER — OFFICE VISIT (OUTPATIENT)
Dept: INTERNAL MEDICINE | Facility: CLINIC | Age: 66
End: 2021-07-09
Payer: COMMERCIAL

## 2021-07-09 VITALS
TEMPERATURE: 97.2 F | BODY MASS INDEX: 38.11 KG/M2 | RESPIRATION RATE: 16 BRPM | DIASTOLIC BLOOD PRESSURE: 80 MMHG | HEART RATE: 74 BPM | SYSTOLIC BLOOD PRESSURE: 130 MMHG | WEIGHT: 222 LBS | OXYGEN SATURATION: 96 %

## 2021-07-09 DIAGNOSIS — E66.01 SEVERE OBESITY WITH BODY MASS INDEX (BMI) OF 35.0 TO 39.9 WITH SERIOUS COMORBIDITY (H): ICD-10-CM

## 2021-07-09 DIAGNOSIS — E78.5 HYPERLIPIDEMIA WITH TARGET LDL LESS THAN 100: ICD-10-CM

## 2021-07-09 DIAGNOSIS — I10 ESSENTIAL HYPERTENSION, BENIGN: ICD-10-CM

## 2021-07-09 DIAGNOSIS — G47.33 OSA (OBSTRUCTIVE SLEEP APNEA): ICD-10-CM

## 2021-07-09 DIAGNOSIS — E11.9 TYPE 2 DIABETES MELLITUS WITHOUT COMPLICATION, WITHOUT LONG-TERM CURRENT USE OF INSULIN (H): Primary | ICD-10-CM

## 2021-07-09 PROBLEM — E87.6 HYPOKALEMIA: Status: RESOLVED | Noted: 2019-11-06 | Resolved: 2021-07-09

## 2021-07-09 PROCEDURE — 99207 PR FOOT EXAM NO CHARGE: CPT | Mod: 25 | Performed by: INTERNAL MEDICINE

## 2021-07-09 PROCEDURE — 99214 OFFICE O/P EST MOD 30 MIN: CPT | Performed by: INTERNAL MEDICINE

## 2021-07-09 RX ORDER — AMLODIPINE BESYLATE 10 MG/1
10 TABLET ORAL DAILY
Qty: 90 TABLET | Refills: 3 | Status: SHIPPED | OUTPATIENT
Start: 2021-07-09 | End: 2022-08-18

## 2021-07-09 RX ORDER — METFORMIN HCL 500 MG
TABLET, EXTENDED RELEASE 24 HR ORAL
Qty: 270 TABLET | Refills: 3 | Status: SHIPPED | OUTPATIENT
Start: 2021-07-09 | End: 2022-08-18

## 2021-07-09 RX ORDER — PRAVASTATIN SODIUM 20 MG
20 TABLET ORAL DAILY
Qty: 90 TABLET | Refills: 3 | Status: SHIPPED | OUTPATIENT
Start: 2021-07-09 | End: 2022-08-18

## 2021-07-09 RX ORDER — LOSARTAN POTASSIUM AND HYDROCHLOROTHIAZIDE 25; 100 MG/1; MG/1
1 TABLET ORAL DAILY
Qty: 90 TABLET | Refills: 3 | Status: SHIPPED | OUTPATIENT
Start: 2021-07-09 | End: 2022-08-18

## 2021-07-09 NOTE — PATIENT INSTRUCTIONS
Continue current meds  Prescriptions refilled.    Call  [phone] or use [employer] to schedule a future lab appointment  fasting in 6 months (blood and urine).   For fasting labs, please refrain from eating for 8 hours or more.   Drink 2 glasses of water before your lab appointment. It is fine to take your  oral medications on the morning of the lab test as usual  Schedule a follow up appointment with me in clinic a few days after these future labs are drawn to review results and other medical issues as necessary  Continue to reduce calorie/carbohydrate (sugar, bread, potato, pasta, rice, alcohol etc)  intake in diet.  Increase color on your plate with fruits and vegetables. Increase  frequency of walking or other aerobic exercise as able (goal is daily)  I would recommend you receive an influenza (flu) vaccine  this Fall (October or early November)  If increased fatigue,  inform physician and will refer back to sleep clinic to discuss reinitiation of CPAP therapy for sleep apnea

## 2021-07-09 NOTE — PROGRESS NOTES
ASSESSMENT:   1. Type 2 diabetes mellitus without complication, without long-term current use of insulin (H)  Controlled. Continue current medication. Repeat A1c 6 months  - FOOT EXAM  - metFORMIN (GLUCOPHAGE-XR) 500 MG 24 hr tablet; TAKE 3 TABLETS BY MOUTH EVERY DAY  Dispense: 270 tablet; Refill: 3    2. Essential hypertension, benign  Controlled. Continue current medications  - amLODIPine (NORVASC) 10 MG tablet; Take 1 tablet (10 mg) by mouth daily  Dispense: 90 tablet; Refill: 3  - losartan-hydrochlorothiazide (HYZAAR) 100-25 MG tablet; Take 1 tablet by mouth daily  Dispense: 90 tablet; Refill: 3    3. Hyperlipidemia with target LDL less than 100  Controlled. Continue current medication. Repeat labs 6 months  - pravastatin (PRAVACHOL) 20 MG tablet; Take 1 tablet (20 mg) by mouth daily  Dispense: 90 tablet; Refill: 3    4. Severe obesity with body mass index (BMI) of 35.0 to 39.9 with serious comorbidity (H)  Weight down. Encouraged continued calorie/carbohydrate reduction and increase in exercise    5. DIXIE (obstructive sleep apnea)  Currently untreated. Patient feels energy doing well at this time. If energy worsens, patient to inform physician and will refer back to sleep clinic to restart CPAP therapy      PLAN:  Continue current meds  Prescriptions refilled.    Call  [phone] or use [employer] to schedule a future lab appointment  fasting in 6 months (blood and urine).   For fasting labs, please refrain from eating for 8 hours or more.   Drink 2 glasses of water before your lab appointment. It is fine to take your  oral medications on the morning of the lab test as usual  Schedule a follow up appointment with me in clinic a few days after these future labs are drawn to review results and other medical issues as necessary  Continue to reduce calorie/carbohydrate (sugar, bread, potato, pasta, rice, alcohol etc)  intake in diet.  Increase color on your plate with fruits and vegetables. Increase  frequency of  walking or other aerobic exercise as able (goal is daily)  I would recommend you receive an influenza (flu) vaccine  this Fall (October or early November)  If increased fatigue,  inform physician and will refer back to sleep clinic to discuss reinitiation of CPAP therapy for sleep apnea  Healthcare maintenance currently up-to-date  Future labs ordered through healthcare maintenance lab order protocol      (Chart documentation was completed, in part, with [employer] voice-recognition software. Even though reviewed, some grammatical, spelling, and word errors may remain.)    Radames Reed MD  Internal Medicine Department  [facility]TRISH Peterson is a 65 year old who presents for the following health issues     HPI     Diabetes Follow-up      How often are you checking your blood sugar? Not at all    What concerns do you have today about your diabetes? None   Do you have any of these symptoms? (Select all that apply) -Tingling if feet, but not often            Hyperlipidemia Follow-Up      Are you regularly taking any medication or supplement to lower your cholesterol?   Yes- Pravastatin    Are you having muscle aches or other side effects that you think could be caused by your cholesterol lowering medication?  Yes- Cramping at PM    Hypertension Follow-up      Do you check your blood pressure regularly outside of the clinic? No     Are you following a low salt diet? Yes    Are your blood pressures ever more than 140 on the top number (systolic) OR more   than 90 on the bottom number (diastolic), for example 140/90? N/A    BP Readings from Last 2 Encounters:   05/03/21 (!) 143/69   03/30/21 128/68     Hemoglobin A1C (%)   Date Value   06/08/2021 6.8 (H)   12/22/2020 6.6 (H)     LDL Cholesterol Calculated (mg/dL)   Date Value   12/22/2020 64   10/21/2019 57      Most recent lab results reviewed with pt.      Component      Latest Ref Rng & Units 5/20/2020 12/22/2020 5/3/2021  6/8/2021   Sodium      133 - 144 mmol/L 138 138 137    Potassium      3.4 - 5.3 mmol/L 3.7 4.2 3.4    Chloride      94 - 109 mmol/L 103 103 104    Carbon Dioxide      20 - 32 mmol/L 30 29 30    Anion Gap      3 - 14 mmol/L 5 6 3    Glucose      70 - 99 mg/dL 175 (H) 139 (H) 125 (H)    Urea Nitrogen      7 - 30 mg/dL 14 14 17    Creatinine      0.52 - 1.04 mg/dL 0.62 0.81 0.76    GFR Estimate      >60 mL/min/1.73:m2 >90 75 81    GFR Estimate If Black      >60 mL/min/1.73:m2 >90 87 >90    Calcium      8.5 - 10.1 mg/dL 9.0 9.5 9.3    Bilirubin Total      0.2 - 1.3 mg/dL 0.4 0.7 0.7    Albumin      3.4 - 5.0 g/dL 3.3 (L) 3.4 3.6    Protein Total      6.8 - 8.8 g/dL 8.2 8.0 8.5    Alkaline Phosphatase      40 - 150 U/L 67 74 79    ALT      0 - 50 U/L 34 35 34    AST      0 - 45 U/L 23 28 23    Bilirubin Direct      0.0 - 0.2 mg/dL   0.2    Cholesterol      <200 mg/dL  142     Triglycerides      <150 mg/dL  115     HDL Cholesterol      >49 mg/dL  55     LDL Cholesterol Calculated      <100 mg/dL  64     Non HDL Cholesterol      <130 mg/dL  87     Creatinine Urine      mg/dL  127     Albumin Urine mg/L      mg/L  16     Albumin Urine mg/g Cr      0 - 25 mg/g Cr  12.44     Hemoglobin A1C      0 - 5.6 % 6.8 (H) 6.6 (H)  6.8 (H)   TSH      0.40 - 4.00 mU/L  3.30       Denies CP, SOB, abdominal pain, polyuria, polydipsia, vision changes,   or skin problems.   Rare tingling in feet.   Last eye exam   Eye exam Fall 2020   Weight down 8 pounds in 1 year  Not exercising.  Has been using Metformin 4 tablets/day but has some loose stools with that. Reduce the dose to 3 tablets daily and stool side effects have resolved.  History of sleep apnea. Has not been using CPAP as she lost her machine during her move. Energy okay during the day and does not feel she needs it at this time.       Additional ROS:   Constitutional, HEENT, Cardiovascular, Pulmonary, GI and , Neuro, MSK and Psych review of systems/symptoms are otherwise negative  "or unchanged from previous, except as noted above.      OBJECTIVE:  /80   Pulse 74   Temp 97.2  F (36.2  C) (Temporal)   Resp 16   Wt 100.7 kg (222 lb)   LMP 10/26/2006   SpO2 96%   BMI 38.11 kg/m     Estimated body mass index is 38.11 kg/m  as calculated from the following:    Height as of 6/4/20: 1.626 m (5' 4\").    Weight as of this encounter: 100.7 kg (222 lb).     Neck: no adenopathy. Thyroid normal to palpation. No bruits  Pulm: Lungs clear to auscultation   CV: Regular rates and rhythm  GI: Soft, obese, nontender, Normal active bowel sounds, No hepatosplenomegaly or masses palpable  Ext: Peripheral pulses intact. No edema.  Neuro: Normal strength and tone, sensory exam grossly normal              "

## 2021-07-16 NOTE — TELEPHONE ENCOUNTER
Patient already saw Dr. Reed. Notes she only takes 3 metformin, looks like most recent script was sent this way. tiffanie

## 2021-09-09 ENCOUNTER — TRANSFERRED RECORDS (OUTPATIENT)
Dept: HEALTH INFORMATION MANAGEMENT | Facility: CLINIC | Age: 66
End: 2021-09-09

## 2021-09-09 LAB — RETINOPATHY: NEGATIVE

## 2021-10-24 ENCOUNTER — HEALTH MAINTENANCE LETTER (OUTPATIENT)
Age: 66
End: 2021-10-24

## 2021-11-15 ENCOUNTER — ANCILLARY PROCEDURE (OUTPATIENT)
Dept: MAMMOGRAPHY | Facility: CLINIC | Age: 66
End: 2021-11-15
Attending: INTERNAL MEDICINE
Payer: COMMERCIAL

## 2021-11-15 DIAGNOSIS — Z12.31 VISIT FOR SCREENING MAMMOGRAM: ICD-10-CM

## 2021-11-15 DIAGNOSIS — Z12.31 OTHER SCREENING MAMMOGRAM: ICD-10-CM

## 2021-11-15 PROCEDURE — 77063 BREAST TOMOSYNTHESIS BI: CPT | Mod: TC | Performed by: RADIOLOGY

## 2021-11-15 PROCEDURE — 77067 SCR MAMMO BI INCL CAD: CPT | Mod: TC | Performed by: RADIOLOGY

## 2021-12-19 ENCOUNTER — HEALTH MAINTENANCE LETTER (OUTPATIENT)
Age: 66
End: 2021-12-19

## 2022-01-12 ENCOUNTER — LAB (OUTPATIENT)
Dept: URGENT CARE | Facility: URGENT CARE | Age: 67
End: 2022-01-12
Payer: COMMERCIAL

## 2022-01-12 DIAGNOSIS — Z20.822 SUSPECTED COVID-19 VIRUS INFECTION: ICD-10-CM

## 2022-01-12 PROCEDURE — U0005 INFEC AGEN DETEC AMPLI PROBE: HCPCS

## 2022-01-12 PROCEDURE — U0003 INFECTIOUS AGENT DETECTION BY NUCLEIC ACID (DNA OR RNA); SEVERE ACUTE RESPIRATORY SYNDROME CORONAVIRUS 2 (SARS-COV-2) (CORONAVIRUS DISEASE [COVID-19]), AMPLIFIED PROBE TECHNIQUE, MAKING USE OF HIGH THROUGHPUT TECHNOLOGIES AS DESCRIBED BY CMS-2020-01-R: HCPCS

## 2022-01-13 LAB — SARS-COV-2 RNA RESP QL NAA+PROBE: NEGATIVE

## 2022-01-25 ENCOUNTER — LAB (OUTPATIENT)
Dept: LAB | Facility: CLINIC | Age: 67
End: 2022-01-25
Payer: COMMERCIAL

## 2022-01-25 DIAGNOSIS — Z13.220 SCREENING FOR HYPERLIPIDEMIA: ICD-10-CM

## 2022-01-25 DIAGNOSIS — E11.9 TYPE 2 DIABETES MELLITUS WITHOUT COMPLICATION, WITHOUT LONG-TERM CURRENT USE OF INSULIN (H): ICD-10-CM

## 2022-01-25 LAB
CHOLEST SERPL-MCNC: 144 MG/DL
FASTING STATUS PATIENT QL REPORTED: YES
HBA1C MFR BLD: 6.8 % (ref 0–5.6)
HDLC SERPL-MCNC: 49 MG/DL
LDLC SERPL CALC-MCNC: 65 MG/DL
NONHDLC SERPL-MCNC: 95 MG/DL
TRIGL SERPL-MCNC: 150 MG/DL

## 2022-01-25 PROCEDURE — 36415 COLL VENOUS BLD VENIPUNCTURE: CPT

## 2022-01-25 PROCEDURE — 82043 UR ALBUMIN QUANTITATIVE: CPT

## 2022-01-25 PROCEDURE — 80061 LIPID PANEL: CPT

## 2022-01-25 PROCEDURE — 83036 HEMOGLOBIN GLYCOSYLATED A1C: CPT

## 2022-01-27 LAB
CREAT UR-MCNC: 268 MG/DL
MICROALBUMIN UR-MCNC: 215 MG/L
MICROALBUMIN/CREAT UR: 80.22 MG/G CR (ref 0–25)

## 2022-02-13 ENCOUNTER — HEALTH MAINTENANCE LETTER (OUTPATIENT)
Age: 67
End: 2022-02-13

## 2022-07-31 ENCOUNTER — HEALTH MAINTENANCE LETTER (OUTPATIENT)
Age: 67
End: 2022-07-31

## 2022-08-09 ENCOUNTER — OFFICE VISIT (OUTPATIENT)
Dept: URGENT CARE | Facility: URGENT CARE | Age: 67
End: 2022-08-09
Payer: COMMERCIAL

## 2022-08-09 VITALS
OXYGEN SATURATION: 98 % | SYSTOLIC BLOOD PRESSURE: 146 MMHG | TEMPERATURE: 98.2 F | DIASTOLIC BLOOD PRESSURE: 81 MMHG | HEART RATE: 66 BPM

## 2022-08-09 DIAGNOSIS — J06.9 VIRAL URI WITH COUGH: Primary | ICD-10-CM

## 2022-08-09 DIAGNOSIS — R07.0 THROAT PAIN: ICD-10-CM

## 2022-08-09 LAB
DEPRECATED S PYO AG THROAT QL EIA: NEGATIVE
GROUP A STREP BY PCR: NOT DETECTED
SARS-COV-2 RNA RESP QL NAA+PROBE: NEGATIVE

## 2022-08-09 PROCEDURE — U0005 INFEC AGEN DETEC AMPLI PROBE: HCPCS | Performed by: FAMILY MEDICINE

## 2022-08-09 PROCEDURE — U0003 INFECTIOUS AGENT DETECTION BY NUCLEIC ACID (DNA OR RNA); SEVERE ACUTE RESPIRATORY SYNDROME CORONAVIRUS 2 (SARS-COV-2) (CORONAVIRUS DISEASE [COVID-19]), AMPLIFIED PROBE TECHNIQUE, MAKING USE OF HIGH THROUGHPUT TECHNOLOGIES AS DESCRIBED BY CMS-2020-01-R: HCPCS | Performed by: FAMILY MEDICINE

## 2022-08-09 PROCEDURE — 87651 STREP A DNA AMP PROBE: CPT | Performed by: FAMILY MEDICINE

## 2022-08-09 PROCEDURE — 99213 OFFICE O/P EST LOW 20 MIN: CPT | Mod: CS | Performed by: FAMILY MEDICINE

## 2022-08-09 RX ORDER — BENZONATATE 100 MG/1
100 CAPSULE ORAL 3 TIMES DAILY PRN
Qty: 21 CAPSULE | Refills: 0 | Status: SHIPPED | OUTPATIENT
Start: 2022-08-09 | End: 2022-08-16

## 2022-08-09 NOTE — PROGRESS NOTES
SUBJECTIVE: Kristen Moser is a 66 year old female presenting with a chief complaint of cough  and sore throat.  Onset of symptoms was 2 day(s) ago.  Current and Associated symptoms: cough - non-productive  Treatment measures tried include Tylenol/Ibuprofen.  Predisposing factors include None.    Past Medical History:   Diagnosis Date     Colon polyps  May 2011     Tubular adenoma     Elevated sedimentation rate      Essential hypertension, benign      Hemangioma of other sites      Hyperlipidemia LDL goal < 100      Iron deficiency anemia, unspecified      Mild major depression (H)      DIXIE (obstructive sleep apnea)     not CPAP     Other specified disease of white blood cells      Postinflammatory pulmonary fibrosis (H)      Severe obesity (BMI 35.0-39.9) with comorbidity (H) 11/5/2018     Type 2 diabetes mellitus without complication (goal A1C<7) 10/17/2015     Unspecified breast disorder 11/04    cyst left breast s/p aspiration     Vitamin D deficiency 11/10/2012     Allergies   Allergen Reactions     Lisinopril      cough     Social History     Tobacco Use     Smoking status: Never Smoker     Smokeless tobacco: Never Used   Substance Use Topics     Alcohol use: No     Comment: 1 drink / month       ROS:  SKIN: no rash  GI: no vomiting    OBJECTIVE:  BP (!) 146/81 (BP Location: Right arm, Patient Position: Sitting, Cuff Size: Adult Regular)   Pulse 66   Temp 98.2  F (36.8  C)   LMP 10/26/2006   SpO2 98% GENERAL APPEARANCE: healthy, alert and no distress  EYES: EOMI,  PERRL, conjunctiva clear  HENT: ear canals and TM's normal.  Nose and mouth without ulcers, erythema or lesions  RESP: lungs clear to auscultation - no rales, rhonchi or wheezes  SKIN: no suspicious lesions or rashes      ICD-10-CM    1. Viral URI with cough  J06.9 benzonatate (TESSALON) 100 MG capsule   2. Throat pain  R07.0 Symptomatic; Unknown COVID-19 Virus (Coronavirus) by PCR Nose     Streptococcus A Rapid Screen w/Reflex to PCR - Clinic  Collect     Group A Streptococcus PCR Throat Swab       Fluids/Rest, f/u if worse/not any better

## 2022-08-18 ENCOUNTER — OFFICE VISIT (OUTPATIENT)
Dept: INTERNAL MEDICINE | Facility: CLINIC | Age: 67
End: 2022-08-18
Payer: COMMERCIAL

## 2022-08-18 VITALS
OXYGEN SATURATION: 100 % | HEIGHT: 64 IN | WEIGHT: 221.1 LBS | SYSTOLIC BLOOD PRESSURE: 132 MMHG | BODY MASS INDEX: 37.75 KG/M2 | HEART RATE: 61 BPM | DIASTOLIC BLOOD PRESSURE: 73 MMHG | TEMPERATURE: 98.6 F

## 2022-08-18 DIAGNOSIS — Z12.31 ENCOUNTER FOR SCREENING MAMMOGRAM FOR BREAST CANCER: ICD-10-CM

## 2022-08-18 DIAGNOSIS — Z23 HIGH PRIORITY FOR 2019-NCOV VACCINE: ICD-10-CM

## 2022-08-18 DIAGNOSIS — L71.9 ROSACEA: ICD-10-CM

## 2022-08-18 DIAGNOSIS — E11.69 TYPE 2 DIABETES MELLITUS WITH OTHER SPECIFIED COMPLICATION, WITHOUT LONG-TERM CURRENT USE OF INSULIN (H): ICD-10-CM

## 2022-08-18 DIAGNOSIS — G57.11 MERALGIA PARAESTHETICA, RIGHT: ICD-10-CM

## 2022-08-18 DIAGNOSIS — E66.01 SEVERE OBESITY WITH BODY MASS INDEX (BMI) OF 35.0 TO 39.9 WITH SERIOUS COMORBIDITY (H): ICD-10-CM

## 2022-08-18 DIAGNOSIS — R25.2 LEG CRAMPS: ICD-10-CM

## 2022-08-18 DIAGNOSIS — Z00.01 ENCOUNTER FOR ROUTINE ADULT MEDICAL EXAM WITH ABNORMAL FINDINGS: ICD-10-CM

## 2022-08-18 DIAGNOSIS — I10 ESSENTIAL HYPERTENSION, BENIGN: ICD-10-CM

## 2022-08-18 DIAGNOSIS — E78.5 HYPERLIPIDEMIA WITH TARGET LDL LESS THAN 100: ICD-10-CM

## 2022-08-18 DIAGNOSIS — R80.9 PROTEINURIA, UNSPECIFIED TYPE: ICD-10-CM

## 2022-08-18 LAB
ALBUMIN SERPL-MCNC: 3.5 G/DL (ref 3.4–5)
ALP SERPL-CCNC: 59 U/L (ref 40–150)
ALT SERPL W P-5'-P-CCNC: 45 U/L (ref 0–50)
ANION GAP SERPL CALCULATED.3IONS-SCNC: 9 MMOL/L (ref 3–14)
AST SERPL W P-5'-P-CCNC: 39 U/L (ref 0–45)
BILIRUB SERPL-MCNC: 0.7 MG/DL (ref 0.2–1.3)
BUN SERPL-MCNC: 17 MG/DL (ref 7–30)
CALCIUM SERPL-MCNC: 9.2 MG/DL (ref 8.5–10.1)
CHLORIDE BLD-SCNC: 103 MMOL/L (ref 94–109)
CHOLEST SERPL-MCNC: 130 MG/DL
CO2 SERPL-SCNC: 28 MMOL/L (ref 20–32)
CREAT SERPL-MCNC: 0.64 MG/DL (ref 0.52–1.04)
FASTING STATUS PATIENT QL REPORTED: YES
FERRITIN SERPL-MCNC: 44 NG/ML (ref 8–252)
GFR SERPL CREATININE-BSD FRML MDRD: >90 ML/MIN/1.73M2
GLUCOSE BLD-MCNC: 150 MG/DL (ref 70–99)
HBA1C MFR BLD: 6.9 % (ref 0–5.6)
HDLC SERPL-MCNC: 51 MG/DL
LDLC SERPL CALC-MCNC: 49 MG/DL
MAGNESIUM SERPL-MCNC: 2 MG/DL (ref 1.6–2.3)
NONHDLC SERPL-MCNC: 79 MG/DL
POTASSIUM BLD-SCNC: 3.3 MMOL/L (ref 3.4–5.3)
PROT SERPL-MCNC: 7.7 G/DL (ref 6.8–8.8)
SODIUM SERPL-SCNC: 140 MMOL/L (ref 133–144)
TRIGL SERPL-MCNC: 150 MG/DL

## 2022-08-18 PROCEDURE — 91306 COVID-19,PF,MODERNA (18+ YRS BOOSTER .25ML): CPT | Performed by: INTERNAL MEDICINE

## 2022-08-18 PROCEDURE — 36415 COLL VENOUS BLD VENIPUNCTURE: CPT | Performed by: INTERNAL MEDICINE

## 2022-08-18 PROCEDURE — 80053 COMPREHEN METABOLIC PANEL: CPT | Performed by: INTERNAL MEDICINE

## 2022-08-18 PROCEDURE — 83735 ASSAY OF MAGNESIUM: CPT | Performed by: INTERNAL MEDICINE

## 2022-08-18 PROCEDURE — G0439 PPPS, SUBSEQ VISIT: HCPCS | Performed by: INTERNAL MEDICINE

## 2022-08-18 PROCEDURE — 0064A COVID-19,PF,MODERNA (18+ YRS BOOSTER .25ML): CPT | Performed by: INTERNAL MEDICINE

## 2022-08-18 PROCEDURE — 82728 ASSAY OF FERRITIN: CPT | Performed by: INTERNAL MEDICINE

## 2022-08-18 PROCEDURE — 80061 LIPID PANEL: CPT | Performed by: INTERNAL MEDICINE

## 2022-08-18 PROCEDURE — 83036 HEMOGLOBIN GLYCOSYLATED A1C: CPT | Performed by: INTERNAL MEDICINE

## 2022-08-18 PROCEDURE — 99214 OFFICE O/P EST MOD 30 MIN: CPT | Mod: 25 | Performed by: INTERNAL MEDICINE

## 2022-08-18 RX ORDER — AMLODIPINE BESYLATE 10 MG/1
10 TABLET ORAL DAILY
Qty: 90 TABLET | Refills: 3 | Status: SHIPPED | OUTPATIENT
Start: 2022-08-18 | End: 2023-10-13

## 2022-08-18 RX ORDER — METFORMIN HCL 500 MG
TABLET, EXTENDED RELEASE 24 HR ORAL
Qty: 270 TABLET | Refills: 3 | Status: SHIPPED | OUTPATIENT
Start: 2022-08-18 | End: 2023-10-13

## 2022-08-18 RX ORDER — PRAVASTATIN SODIUM 20 MG
20 TABLET ORAL DAILY
Qty: 90 TABLET | Refills: 3 | Status: SHIPPED | OUTPATIENT
Start: 2022-08-18 | End: 2023-10-13

## 2022-08-18 RX ORDER — LOSARTAN POTASSIUM AND HYDROCHLOROTHIAZIDE 25; 100 MG/1; MG/1
1 TABLET ORAL DAILY
Qty: 90 TABLET | Refills: 3 | Status: SHIPPED | OUTPATIENT
Start: 2022-08-18 | End: 2023-10-13

## 2022-08-18 ASSESSMENT — ENCOUNTER SYMPTOMS
DIARRHEA: 0
ABDOMINAL PAIN: 0
NERVOUS/ANXIOUS: 0
PALPITATIONS: 0
HEMATOCHEZIA: 0
WEAKNESS: 0
SORE THROAT: 0
PARESTHESIAS: 0
NAUSEA: 0
BREAST MASS: 0
FEVER: 0
FREQUENCY: 0
DIZZINESS: 0
ARTHRALGIAS: 0
CHILLS: 0
SHORTNESS OF BREATH: 0
HEADACHES: 0
DYSURIA: 0
EYE PAIN: 0
COLOR CHANGE: 1
CONSTIPATION: 0
JOINT SWELLING: 0
MYALGIAS: 1
COUGH: 0
HEARTBURN: 0
PARESTHESIAS: 1
HEMATURIA: 0

## 2022-08-18 ASSESSMENT — ACTIVITIES OF DAILY LIVING (ADL): CURRENT_FUNCTION: NO ASSISTANCE NEEDED

## 2022-08-18 NOTE — PATIENT INSTRUCTIONS
Stop Aspirin  Continue other medications  Prescriptions refilled.    Labs today as ordered  Call  [phone] or use [employer] to schedule a future lab appointment  fasting in 6 months for cholesterol follow-up.   For fasting labs, please refrain from eating for 8 hours or more.   Drink 2 glasses of water before your lab appointment. It is fine to take your  oral medications on the morning of the lab test as usual  Mammogram  11/16/22 or later    This will be done at the [facility]. Call [phone] or use [employer] to schedule.   Vaccinations: Moderna covid vaccine   Eye exam appointment in September  At [facility]. Please ask the eye clinic to fax us a report of your eye exam to [phone], attention Dr Reed  I would recommend you receive an influenza (flu) vaccine  this Fall (mid/late October)  Colonoscopy Spring 2023  Stretching right groin area at night. Wear looser underwear at night and continue weight loss with diet/exercise   Monitor rosacea facial skin changes. Contact clinic if wish to start topical Metronidazole cream in the future  Pt was informed regarding extra E&M billing for management of new or established medical issues not related to today's wellness visit

## 2022-08-18 NOTE — PROGRESS NOTES
"SUBJECTIVE:   Kristen Moser is a 67 year old female who presents for Preventive Visit and follow-up regarding diabetes, hypertension, hyperlipidemia.  Patient states she is not under Medicare insurance yet    Patient has been advised of split billing requirements and indicates understanding: Yes  Are you in the first 12 months of your Medicare coverage?  No    Healthy Habits:     In general, how would you rate your overall health?  Good    Frequency of exercise:  None    Do you usually eat at least 4 servings of fruit and vegetables a day, include whole grains    & fiber and avoid regularly eating high fat or \"junk\" foods?  No    Taking medications regularly:  No    Barriers to taking medications:  None    Medication side effects:  None    Ability to successfully perform activities of daily living:  No assistance needed    Home Safety:  No safety concerns identified    Hearing Impairment:  Need to ask people to speak up or repeat themselves    In the past 6 months, have you been bothered by leaking of urine?  No    In general, how would you rate your overall mental or emotional health?  Good      PHQ-2 Total Score: 0    Additional concerns today:  Yes    Do you feel safe in your environment? Yes    Have you ever done Advance Care Planning? (For example, a Health Directive, POLST, or a discussion with a medical provider or your loved ones about your wishes): Yes, patient states has an Advance Care Planning document and will bring a copy to the clinic.       Fall risk  Fallen 2 or more times in the past year?: No  Any fall with injury in the past year?: No    Cognitive Screening   1) Repeat 3 items (Leader, Season, Table)    2) Clock draw: NORMAL  3) 3 item recall: Recalls 2 objects   Results: NORMAL clock, 1-2 items recalled: COGNITIVE IMPAIRMENT LESS LIKELY    Mini-CogTM Copyright NORBERTO Randolph. Licensed by the author for use in [employer]; reprinted with permission (fatmata@.[facility]). All rights reserved.  "     Do you have sleep apnea, excessive snoring or daytime drowsiness?: yes    Reviewed and updated as needed this visit by clinical staff   Tobacco  Allergies  Meds                Reviewed and updated as needed this visit by Provider                   Social History     Tobacco Use     Smoking status: Never Smoker     Smokeless tobacco: Never Used   Substance Use Topics     Alcohol use: No     Comment: 1 drink / month     If you drink alcohol do you typically have >3 drinks per day or >7 drinks per week? No    Alcohol Use 8/18/2022   Prescreen: >3 drinks/day or >7 drinks/week? No   Prescreen: >3 drinks/day or >7 drinks/week? -               Current providers sharing in care for this patient include:   Patient Care Team:  Radames Reed MD as PCP - General  Radames Reed MD as Assigned PCP    The following health maintenance items are reviewed in Epic and correct as of today:  Health Maintenance Due   Topic Date Due     COVID-19 Vaccine (4 - Booster for Pfizer series) 03/05/2022     ALT  05/03/2022     BMP  06/08/2022     CREATININE  06/08/2022     DIABETIC FOOT EXAM  07/09/2022     ANNUAL REVIEW OF HM ORDERS  07/09/2022     A1C  07/25/2022     EYE EXAM  09/09/2022     Labs reviewed in EPIC      Component      Latest Ref Rng & Units 12/22/2020 6/8/2021 1/25/2022   Sodium      133 - 144 mmol/L 138 137    Potassium      3.4 - 5.3 mmol/L 4.2 3.5    Chloride      94 - 109 mmol/L 103 102    Carbon Dioxide      20 - 32 mmol/L 29 32    Anion Gap      3 - 14 mmol/L 6 3    Glucose      70 - 99 mg/dL 139 (H) 155 (H)    Urea Nitrogen      7 - 30 mg/dL 14 15    Creatinine      0.52 - 1.04 mg/dL 0.81 0.79    GFR Estimate      >60 mL/min/1.73:m2 75 78    GFR Estimate If Black      >60 mL/min/1.73:m2 87 >90    Calcium      8.5 - 10.1 mg/dL 9.5 8.9    Bilirubin Total      0.2 - 1.3 mg/dL 0.7     Albumin      3.4 - 5.0 g/dL 3.4     Protein Total      6.8 - 8.8 g/dL 8.0     Alkaline Phosphatase      40 - 150 U/L 74     ALT      0 -  50 U/L 35     AST      0 - 45 U/L 28     Cholesterol      <200 mg/dL 142  144   Triglycerides      <150 mg/dL 115  150 (H)   HDL Cholesterol      >=50 mg/dL 55  49 (L)   LDL Cholesterol Calculated      <=100 mg/dL 64  65   Non HDL Cholesterol      <130 mg/dL 87  95   Patient Fasting > 8hrs?         Yes   Creatinine Urine      mg/dL 127  268   Albumin Urine mg/L      mg/L 16  215   Albumin Urine mg/g Cr      0.00 - 25.00 mg/g Cr 12.44  80.22 (H)   TSH      0.40 - 4.00 mU/L 3.30     Hemoglobin A1C      0.0 - 5.6 % 6.6 (H) 6.8 (H) 6.8 (H)     Any new diagnosis of family breast, ovarian, or bowel cancer? No    FHS-7:   Breast CA Risk Assessment (FHS-7) 11/15/2021   Did any of your first-degree relatives have breast or ovarian cancer? No   Did any of your relatives have bilateral breast cancer? No   Did any man in your family have breast cancer? No   Did any woman in your family have breast and ovarian cancer? No   Did any woman in your family have breast cancer before age 50 y? No   Do you have 2 or more relatives with breast and/or ovarian cancer? No   Do you have 2 or more relatives with breast and/or bowel cancer? No       Mammogram Screening: Recommended mammography every 1-2 years with patient discussion and risk factor consideration  Pertinent mammograms are reviewed under the imaging tab.    Review of Systems   Constitutional: Negative for chills and fever.   HENT: Negative for congestion, ear pain, hearing loss and sore throat.    Eyes: Negative for pain and visual disturbance.   Respiratory: Negative for cough and shortness of breath.    Cardiovascular: Negative for chest pain, palpitations and peripheral edema.   Gastrointestinal: Negative for abdominal pain, constipation, diarrhea, heartburn, hematochezia and nausea.   Breasts:  Negative for tenderness, breast mass and discharge.   Genitourinary: Negative for dysuria, frequency, genital sores, hematuria, pelvic pain, urgency, vaginal bleeding and vaginal  "discharge.   Musculoskeletal: Positive for myalgias. Negative for arthralgias and joint swelling.   Skin: Positive for color change. Negative for rash.   Neurological: Positive for paresthesias. Negative for dizziness, weakness and headaches.   Psychiatric/Behavioral: Negative for mood changes. The patient is not nervous/anxious.      Gets occasional cramps in her legs at night.  Not during the day with activity.  History of diabetes.  Not checking blood sugars at home.  Has noticed some acne-like changes to skin on her face.  Not occurring other parts of her body.  Occasional fluttering redness on her base.  No changes in soaps, make-up, detergents  Occasional burning tingling right lateral thigh only.  Not on the medial thigh area or further down the leg.  Denies back pain currently.  Symptoms mostly occurring when laying down at night  Has lost 1 pound in the last year.  Weight continues to overall trend downward    OBJECTIVE:   /73 (BP Location: Left arm)   Pulse 61   Temp 98.6  F (37  C) (Oral)   Ht 1.626 m (5' 4\")   Wt 100.3 kg (221 lb 1.6 oz)   LMP 10/26/2006   SpO2 100%   BMI 37.95 kg/m   Estimated body mass index is 37.95 kg/m  as calculated from the following:    Height as of this encounter: 1.626 m (5' 4\").    Weight as of this encounter: 100.3 kg (221 lb 1.6 oz).  Physical Exam  General appearance -  , alert, no distress  Skin - No  Lesions.  Mild erythema and some small comedone papules on face consistent with rosacea  Head - normocephalic, atraumatic  Eyes - TAMIR, EOMI, fundi exam with nondilated pupils negative.  Ears - External ears normal. Canals clear. TM's normal.  Nose/Sinuses - Nares normal. Septum midline. Mucosa normal. No drainage or sinus tenderness.  Oropharynx - No erythema, no adenopathy, no exudates.  Neck - Supple without adenopathy or thyromegaly. No bruits.  Lungs - Clear to auscultation without wheezes/rhonchi.  Heart - Regular rate and rhythm without murmurs, clicks, " or gallops.  Nodes - No supraclavicular, axillary, or inguinal adenopathy palpable.  Breasts - deferred  Abdomen - Abdomen obese, soft, non-tender. BS normal. No masses or hepatosplenomegaly palpable. No bruits.  Extremities -No cyanosis, clubbing or edema.    Musculoskeletal - Spine ROM normal. Muscular strength intact.   Peripheral pulses - radial=4/4, femoral=4/4, posterior tibial=4/4, dorsalis pedis=4/4,  Neuro - Gait normal. Reflexes normal and symmetric. Sensation grossly WN except for slight hypersensitivity over the lateral cutaneous nerve distribution right lateral thigh.     Genital/Rectal - deferred        ASSESSMENT / PLAN:   1. Encounter for routine adult medical exam with abnormal findings  Patient instructed to stop aspirin with risk now greater than benefit at current age.  Will get COVID  Moderna  Booster vaccine today.  Due for eye exam in September.  Flu shot later this fall.  Due for colonoscopy screening in 1 year.  Mammogram later this fall.  Other healthcare maintenance up-to-date    2. Type 2 diabetes mellitus with other specified complication, without long-term current use of insulin (H)  Not checking blood sugars.  A1c at goal.  Continue metformin therapy and repeat labs 6 months  - Hemoglobin A1c; Future  - Comprehensive metabolic panel; Future  - Ferritin; Future  - metFORMIN (GLUCOPHAGE XR) 500 MG 24 hr tablet; TAKE 3 TABLETS BY MOUTH EVERY DAY  Dispense: 270 tablet; Refill: 3    3. Severe obesity with body mass index (BMI) of 35.0 to 39.9 with serious comorbidity (H)  Slight improvement in weight compared to last year.  Contributing to diabetes, hypertension, hyperlipidemia.  Counseled regarding calorie/carbohydrate reduction and increase in exercise    4. Proteinuria, unspecified type  Slight worsening.  Blood pressure at goal and on maximum dose ARB.  Recheck 1 year  - Albumin Random Urine Quantitative with Creat Ratio; Future    5. Hyperlipidemia with target LDL less than  100  Controlled.  Continue statin therapy.  Repeat labs 1 year  - Lipid panel reflex to direct LDL Fasting; Future  - Comprehensive metabolic panel; Future  - pravastatin (PRAVACHOL) 20 MG tablet; Take 1 tablet (20 mg) by mouth daily  Dispense: 90 tablet; Refill: 3     6. Essential hypertension, benign  Controlled.  Continue current medication.  Future labs as ordered  - Comprehensive metabolic panel; Future  - losartan-hydrochlorothiazide (HYZAAR) 100-25 MG tablet; Take 1 tablet by mouth daily  Dispense: 90 tablet; Refill: 3  - amLODIPine (NORVASC) 10 MG tablet; Take 1 tablet (10 mg) by mouth daily  Dispense: 90 tablet; Refill: 3  - Comprehensive metabolic panel    7. Meralgia paraesthetica, right  Intermittent symptoms right lateral thigh.  Counseled regarding wearing looser underwear at night and sleeping and stretching exercises in the morning right groin area.  Weight loss as above    8. Leg cramps  Nocturnal symptoms only.  Not related to exercise.  Symptoms not bothersome enough to patient that she wishes to consider prescription medication at this time.  Labs as ordered for possible nutritional issue.  If symptoms worsen, patient to inform physician and will start trial of pramipexole versus ropinirole  - Ferritin; Future  - Magnesium; Future  - Ferritin  - Magnesium  - Comprehensive metabolic panel    9. Rosacea  Mild.  Patient declines treatment with metronidazole at this time    10. Encounter for screening mammogram for breast cancer  Due for follow-up cancer screening starting 11/16/2022  - *MA Screening Digital Bilateral; Future    11. High priority for 2019-nCoV vaccine  Candidate for COVID-vaccine booster.   Moderna recommended with prior Pfizer vaccination  - COVID-19,PF,MODERNA (18+ Yrs BOOSTER .25mL)         Patient has been advised of split billing requirements and indicates understanding: Yes    COUNSELING:  Reviewed preventive health counseling, as reflected in patient instructions    Estimated  "body mass index is 37.95 kg/m  as calculated from the following:    Height as of this encounter: 1.626 m (5' 4\").    Weight as of this encounter: 100.3 kg (221 lb 1.6 oz).    Weight management plan: Discussed healthy diet and exercise guidelines    She reports that she has never smoked. She has never used smokeless tobacco.      Appropriate preventive services were discussed with this patient, including applicable screening as appropriate for cardiovascular disease, diabetes, osteopenia/osteoporosis, and glaucoma.  As appropriate for age/gender, discussed screening for colorectal cancer, prostate cancer, breast cancer, and cervical cancer. Checklist reviewing preventive services available has been given to the patient.    Reviewed patients plan of care and provided an AVS. The Basic Care Plan (routine screening as documented in Health Maintenance) for Kristen meets the Care Plan requirement. This Care Plan has been established and reviewed with the Patient.    Counseling Resources:  ATP IV Guidelines  Pooled Cohorts Equation Calculator  Breast Cancer Risk Calculator  Breast Cancer: Medication to Reduce Risk  FRAX Risk Assessment  ICSI Preventive Guidelines  Dietary Guidelines for Americans, 2010  USDA's MyPlate  ASA Prophylaxis  Lung CA Screening      PLAN:   Stop Aspirin  Continue other medications  Prescriptions refilled.    Labs today as ordered  Call  [phone] or use [facility] to schedule a future lab appointment  fasting in 6 months for cholesterol follow-up.   For fasting labs, please refrain from eating for 8 hours or more.   Drink 2 glasses of water before your lab appointment. It is fine to take your  oral medications on the morning of the lab test as usual  Mammogram  11/16/22 or later    This will be done at the [facility]. Call [phone] or use [facility] to schedule.   Vaccinations: Moderna covid vaccine   Eye exam appointment in September  At [facility]. Please ask the eye clinic " to fax us a report of your eye exam to [phone], attention Dr Reed  I would recommend you receive an influenza (flu) vaccine  this Fall (mid/late October)  Colonoscopy Spring 2023  Stretching right groin area at night. Wear looser underwear at night and continue weight loss with diet/exercise   Monitor rosacea facial skin changes. Contact clinic if wish to start topical Metronidazole cream in the future  Pt was informed regarding extra E&M billing for management of new or established medical issues not related to today's wellness visit    Radames Reed MD  [facility]    Identified Health Risks:

## 2022-08-20 DIAGNOSIS — I10 ESSENTIAL HYPERTENSION, BENIGN: ICD-10-CM

## 2022-08-20 DIAGNOSIS — E78.5 HYPERLIPIDEMIA WITH TARGET LDL LESS THAN 100: ICD-10-CM

## 2022-08-21 ENCOUNTER — APPOINTMENT (OUTPATIENT)
Dept: LAB | Facility: CLINIC | Age: 67
End: 2022-08-21
Payer: COMMERCIAL

## 2022-08-21 LAB
CREAT UR-MCNC: 213 MG/DL
MICROALBUMIN UR-MCNC: 21 MG/L
MICROALBUMIN/CREAT UR: 9.86 MG/G CR (ref 0–25)

## 2022-08-21 PROCEDURE — 82043 UR ALBUMIN QUANTITATIVE: CPT | Performed by: INTERNAL MEDICINE

## 2022-08-21 RX ORDER — LOSARTAN POTASSIUM AND HYDROCHLOROTHIAZIDE 25; 100 MG/1; MG/1
TABLET ORAL
Qty: 90 TABLET | Refills: 3 | OUTPATIENT
Start: 2022-08-21

## 2022-08-21 RX ORDER — AMLODIPINE BESYLATE 10 MG/1
TABLET ORAL
Qty: 90 TABLET | Refills: 3 | OUTPATIENT
Start: 2022-08-21

## 2022-08-21 RX ORDER — PRAVASTATIN SODIUM 20 MG
TABLET ORAL
Qty: 90 TABLET | Refills: 3 | OUTPATIENT
Start: 2022-08-21

## 2022-09-28 ENCOUNTER — TRANSFERRED RECORDS (OUTPATIENT)
Dept: HEALTH INFORMATION MANAGEMENT | Facility: CLINIC | Age: 67
End: 2022-09-28

## 2022-09-28 LAB — RETINOPATHY: NEGATIVE

## 2022-10-15 ENCOUNTER — HEALTH MAINTENANCE LETTER (OUTPATIENT)
Age: 67
End: 2022-10-15

## 2022-11-17 ENCOUNTER — ANCILLARY PROCEDURE (OUTPATIENT)
Dept: MAMMOGRAPHY | Facility: CLINIC | Age: 67
End: 2022-11-17
Attending: INTERNAL MEDICINE
Payer: COMMERCIAL

## 2022-11-17 DIAGNOSIS — Z12.31 ENCOUNTER FOR SCREENING MAMMOGRAM FOR BREAST CANCER: ICD-10-CM

## 2022-11-17 DIAGNOSIS — Z12.31 VISIT FOR SCREENING MAMMOGRAM: ICD-10-CM

## 2022-11-17 PROCEDURE — 77063 BREAST TOMOSYNTHESIS BI: CPT | Mod: TC | Performed by: RADIOLOGY

## 2022-11-17 PROCEDURE — 77067 SCR MAMMO BI INCL CAD: CPT | Mod: TC | Performed by: RADIOLOGY

## 2023-01-16 ENCOUNTER — TRANSFERRED RECORDS (OUTPATIENT)
Dept: HEALTH INFORMATION MANAGEMENT | Facility: CLINIC | Age: 68
End: 2023-01-16
Payer: COMMERCIAL

## 2023-03-26 ENCOUNTER — HEALTH MAINTENANCE LETTER (OUTPATIENT)
Age: 68
End: 2023-03-26

## 2023-08-23 ENCOUNTER — TRANSFERRED RECORDS (OUTPATIENT)
Dept: HEALTH INFORMATION MANAGEMENT | Facility: CLINIC | Age: 68
End: 2023-08-23
Payer: COMMERCIAL

## 2023-10-04 ENCOUNTER — TRANSFERRED RECORDS (OUTPATIENT)
Dept: HEALTH INFORMATION MANAGEMENT | Facility: CLINIC | Age: 68
End: 2023-10-04
Payer: COMMERCIAL

## 2023-10-04 LAB — RETINOPATHY: NEGATIVE

## 2023-10-06 ASSESSMENT — ENCOUNTER SYMPTOMS
BREAST MASS: 0
HEARTBURN: 0
ARTHRALGIAS: 0
EYE PAIN: 0
JOINT SWELLING: 0
FREQUENCY: 0
DIZZINESS: 0
FEVER: 0
NERVOUS/ANXIOUS: 0
DYSURIA: 0
PARESTHESIAS: 0
MYALGIAS: 0
PALPITATIONS: 0
DIARRHEA: 0
COUGH: 0
CONSTIPATION: 0
HEMATURIA: 0
ABDOMINAL PAIN: 0
HEMATOCHEZIA: 0
HEADACHES: 0
SHORTNESS OF BREATH: 0
CHILLS: 0
WEAKNESS: 0
SORE THROAT: 0
NAUSEA: 0

## 2023-10-06 ASSESSMENT — ACTIVITIES OF DAILY LIVING (ADL): CURRENT_FUNCTION: NO ASSISTANCE NEEDED

## 2023-10-13 ENCOUNTER — OFFICE VISIT (OUTPATIENT)
Dept: INTERNAL MEDICINE | Facility: CLINIC | Age: 68
End: 2023-10-13
Payer: COMMERCIAL

## 2023-10-13 VITALS
DIASTOLIC BLOOD PRESSURE: 72 MMHG | OXYGEN SATURATION: 98 % | TEMPERATURE: 98.4 F | BODY MASS INDEX: 38.17 KG/M2 | SYSTOLIC BLOOD PRESSURE: 134 MMHG | HEART RATE: 82 BPM | WEIGHT: 223.6 LBS | HEIGHT: 64 IN

## 2023-10-13 DIAGNOSIS — Z00.01 ENCOUNTER FOR ROUTINE ADULT MEDICAL EXAM WITH ABNORMAL FINDINGS: ICD-10-CM

## 2023-10-13 DIAGNOSIS — I10 ESSENTIAL HYPERTENSION, BENIGN: ICD-10-CM

## 2023-10-13 DIAGNOSIS — G47.30 SLEEP APNEA, UNSPECIFIED TYPE: ICD-10-CM

## 2023-10-13 DIAGNOSIS — E78.5 HYPERLIPIDEMIA WITH TARGET LDL LESS THAN 100: ICD-10-CM

## 2023-10-13 DIAGNOSIS — Z23 NEED FOR PROPHYLACTIC VACCINATION WITH TETANUS-DIPHTHERIA (TD): ICD-10-CM

## 2023-10-13 DIAGNOSIS — R25.2 LEG CRAMP: ICD-10-CM

## 2023-10-13 DIAGNOSIS — Z12.31 ENCOUNTER FOR SCREENING MAMMOGRAM FOR BREAST CANCER: ICD-10-CM

## 2023-10-13 DIAGNOSIS — E66.01 SEVERE OBESITY WITH BODY MASS INDEX (BMI) OF 35.0 TO 39.9 WITH SERIOUS COMORBIDITY (H): ICD-10-CM

## 2023-10-13 DIAGNOSIS — E11.9 TYPE 2 DIABETES MELLITUS WITHOUT COMPLICATION, WITHOUT LONG-TERM CURRENT USE OF INSULIN (H): ICD-10-CM

## 2023-10-13 LAB
ALBUMIN SERPL BCG-MCNC: 4.4 G/DL (ref 3.5–5.2)
ALP SERPL-CCNC: 63 U/L (ref 35–104)
ALT SERPL W P-5'-P-CCNC: 34 U/L (ref 0–50)
ANION GAP SERPL CALCULATED.3IONS-SCNC: 11 MMOL/L (ref 7–15)
AST SERPL W P-5'-P-CCNC: 40 U/L (ref 0–45)
BILIRUB SERPL-MCNC: 0.4 MG/DL
BUN SERPL-MCNC: 16.4 MG/DL (ref 8–23)
CALCIUM SERPL-MCNC: 9.8 MG/DL (ref 8.8–10.2)
CHLORIDE SERPL-SCNC: 98 MMOL/L (ref 98–107)
CHOLEST SERPL-MCNC: 137 MG/DL
CREAT SERPL-MCNC: 0.78 MG/DL (ref 0.51–0.95)
CREAT UR-MCNC: 120 MG/DL
DEPRECATED HCO3 PLAS-SCNC: 30 MMOL/L (ref 22–29)
EGFRCR SERPLBLD CKD-EPI 2021: 82 ML/MIN/1.73M2
FERRITIN SERPL-MCNC: 54 NG/ML (ref 11–328)
GLUCOSE SERPL-MCNC: 199 MG/DL (ref 70–99)
HBA1C MFR BLD: 6.7 % (ref 0–5.6)
HDLC SERPL-MCNC: 54 MG/DL
LDLC SERPL CALC-MCNC: 49 MG/DL
MICROALBUMIN UR-MCNC: 25.8 MG/L
MICROALBUMIN/CREAT UR: 21.5 MG/G CR (ref 0–25)
NONHDLC SERPL-MCNC: 83 MG/DL
POTASSIUM SERPL-SCNC: 4.1 MMOL/L (ref 3.4–5.3)
PROT SERPL-MCNC: 7.8 G/DL (ref 6.4–8.3)
SODIUM SERPL-SCNC: 139 MMOL/L (ref 135–145)
TRIGL SERPL-MCNC: 172 MG/DL

## 2023-10-13 PROCEDURE — 83036 HEMOGLOBIN GLYCOSYLATED A1C: CPT | Performed by: INTERNAL MEDICINE

## 2023-10-13 PROCEDURE — 90471 IMMUNIZATION ADMIN: CPT | Performed by: INTERNAL MEDICINE

## 2023-10-13 PROCEDURE — 36415 COLL VENOUS BLD VENIPUNCTURE: CPT | Performed by: INTERNAL MEDICINE

## 2023-10-13 PROCEDURE — 82570 ASSAY OF URINE CREATININE: CPT | Performed by: INTERNAL MEDICINE

## 2023-10-13 PROCEDURE — 99214 OFFICE O/P EST MOD 30 MIN: CPT | Mod: 25 | Performed by: INTERNAL MEDICINE

## 2023-10-13 PROCEDURE — 80061 LIPID PANEL: CPT | Performed by: INTERNAL MEDICINE

## 2023-10-13 PROCEDURE — 82043 UR ALBUMIN QUANTITATIVE: CPT | Performed by: INTERNAL MEDICINE

## 2023-10-13 PROCEDURE — 99397 PER PM REEVAL EST PAT 65+ YR: CPT | Mod: 25 | Performed by: INTERNAL MEDICINE

## 2023-10-13 PROCEDURE — 99207 PR FOOT EXAM NO CHARGE: CPT | Performed by: INTERNAL MEDICINE

## 2023-10-13 PROCEDURE — 90714 TD VACC NO PRESV 7 YRS+ IM: CPT | Performed by: INTERNAL MEDICINE

## 2023-10-13 PROCEDURE — 80053 COMPREHEN METABOLIC PANEL: CPT | Performed by: INTERNAL MEDICINE

## 2023-10-13 PROCEDURE — 82728 ASSAY OF FERRITIN: CPT | Performed by: INTERNAL MEDICINE

## 2023-10-13 RX ORDER — METFORMIN HCL 500 MG
TABLET, EXTENDED RELEASE 24 HR ORAL
Qty: 270 TABLET | Refills: 3 | Status: SHIPPED | OUTPATIENT
Start: 2023-10-13 | End: 2024-09-26

## 2023-10-13 RX ORDER — AMLODIPINE BESYLATE 10 MG/1
10 TABLET ORAL DAILY
Qty: 90 TABLET | Refills: 3 | Status: SHIPPED | OUTPATIENT
Start: 2023-10-13 | End: 2024-09-25

## 2023-10-13 RX ORDER — LOSARTAN POTASSIUM AND HYDROCHLOROTHIAZIDE 25; 100 MG/1; MG/1
1 TABLET ORAL DAILY
Qty: 90 TABLET | Refills: 3 | Status: SHIPPED | OUTPATIENT
Start: 2023-10-13 | End: 2024-09-25

## 2023-10-13 RX ORDER — PRAVASTATIN SODIUM 20 MG
20 TABLET ORAL DAILY
Qty: 90 TABLET | Refills: 3 | Status: SHIPPED | OUTPATIENT
Start: 2023-10-13 | End: 2024-09-25

## 2023-10-13 ASSESSMENT — ENCOUNTER SYMPTOMS
PARESTHESIAS: 0
ARTHRALGIAS: 0
EYE PAIN: 0
HEMATOCHEZIA: 0
FEVER: 0
CHILLS: 0
DIARRHEA: 0
FREQUENCY: 0
HEMATURIA: 0
NAUSEA: 0
HEARTBURN: 0
HEADACHES: 0
BREAST MASS: 0
CONSTIPATION: 0
COUGH: 0
WEAKNESS: 0
ABDOMINAL PAIN: 0
JOINT SWELLING: 0
DYSURIA: 0
SORE THROAT: 0
DIZZINESS: 0
NERVOUS/ANXIOUS: 0
SHORTNESS OF BREATH: 0
MYALGIAS: 0
PALPITATIONS: 0

## 2023-10-13 ASSESSMENT — ACTIVITIES OF DAILY LIVING (ADL): CURRENT_FUNCTION: NO ASSISTANCE NEEDED

## 2023-10-13 NOTE — PROGRESS NOTES
"SUBJECTIVE:   Kristen is a 68 year old who presents for Preventive Visit and follow-up on diabetes, hypertension, hyperlipidemia. Patient states she is not under Medicare insurance yet and still under private insurance through work      Are you in the first 12 months of your Medicare coverage?  No    Healthy Habits:     In general, how would you rate your overall health?  Good    Frequency of exercise:  None    Do you usually eat at least 4 servings of fruit and vegetables a day, include whole grains    & fiber and avoid regularly eating high fat or \"junk\" foods?  No    Taking medications regularly:  Yes    Medication side effects:  None    Ability to successfully perform activities of daily living:  No assistance needed    Home Safety:  No safety concerns identified    Hearing Impairment:  No hearing concerns    In the past 6 months, have you been bothered by leaking of urine?  No    In general, how would you rate your overall mental or emotional health?  Good    Additional concerns today:  No      Today's PHQ-2 Score:       10/13/2023     3:14 PM   PHQ-2 ( 1999 [employer])   Q1: Little interest or pleasure in doing things 0   Q2: Feeling down, depressed or hopeless 0   PHQ-2 Score 0   Q1: Little interest or pleasure in doing things Not at all   Q2: Feeling down, depressed or hopeless Not at all   PHQ-2 Score 0       Have you ever done Advance Care Planning? (For example, a Health Directive, POLST, or a discussion with a medical provider or your loved ones about your wishes): No, advance care planning information given to patient to review.  Patient plans to discuss their wishes with loved ones or provider.         Fall risk  Fallen 2 or more times in the past year?: No  Any fall with injury in the past year?: No    Cognitive Screening   1) Repeat 3 items (Leader, Season, Table)    2) Clock draw: NORMAL  3) 3 item recall: Recalls 3 objects  Results: 3 items recalled: COGNITIVE IMPAIRMENT LESS LIKELY    Mini-CogTM " Copyright NORBERTO Randolph. Licensed by the author for use in [facility]; reprinted with permission (fatmata@[facility]). All rights reserved.      Do you have sleep apnea, excessive snoring or daytime drowsiness? : yes    Reviewed and updated as needed this visit by clinical staff   Tobacco  Allergies  Meds              Reviewed and updated as needed this visit by Provider                 Social History     Tobacco Use    Smoking status: Never    Smokeless tobacco: Never   Substance Use Topics    Alcohol use: Not Currently     Comment: few times a year            10/6/2023     8:49 AM   Alcohol Use   Prescreen: >3 drinks/day or >7 drinks/week? Not Applicable     Do you have a current opioid prescription? No  Do you use any other controlled substances or medications that are not prescribed by a provider? None            Current providers sharing in care for this patient include:   Patient Care Team:  Radames Reed MD as PCP - General  Radames Reed MD as Assigned PCP    The following health maintenance items are reviewed in Epic and correct as of today:  Health Maintenance   Topic Date Due    HEPATITIS B IMMUNIZATION (1 of 3 - Risk 3-dose series) Never done    RSV VACCINE 60+ (1 - 1-dose 60+ series) Never done    DIABETIC FOOT EXAM  07/09/2022    DEXA  08/04/2023    MEDICARE ANNUAL WELLNESS VISIT  08/18/2023    ANNUAL REVIEW OF HM ORDERS  08/18/2023    COVID-19 Vaccine (5 - 2023-24 season) 09/01/2023    MAMMO SCREENING  11/17/2023    A1C  04/13/2024    EYE EXAM  10/04/2024    ALT  10/13/2024    BMP  10/13/2024    LIPID  10/13/2024    MICROALBUMIN  10/13/2024    CREATININE  10/13/2024    FALL RISK ASSESSMENT  10/13/2024    COLORECTAL CANCER SCREENING  01/16/2028    ADVANCE CARE PLANNING  10/13/2028    DTAP/TDAP/TD IMMUNIZATION (3 - Td or Tdap) 10/13/2033    HEPATITIS C SCREENING  Completed    PHQ-2 (once per calendar year)  Completed    INFLUENZA VACCINE  Completed    Pneumococcal Vaccine: 65+ Years  Completed     ZOSTER IMMUNIZATION  Completed    IPV IMMUNIZATION  Aged Out    HPV IMMUNIZATION  Aged Out    MENINGITIS IMMUNIZATION  Aged Out     Labs reviewed in EPIC          8/18/2022     8:15 AM 8/18/2022     8:32 AM   Breast CA Risk Assessment (FHS-7)   Do you have a family history of breast, colon, or ovarian cancer? No / Unknown No / Unknown         Mammogram Screening: Recommended mammography every 1-2 years with patient discussion and risk factor consideration  Pertinent mammograms are reviewed under the imaging tab.    Review of Systems   Constitutional:  Negative for chills and fever.   HENT:  Negative for congestion, ear pain, hearing loss and sore throat.    Eyes:  Negative for pain and visual disturbance.   Respiratory:  Negative for cough and shortness of breath.    Cardiovascular:  Negative for chest pain, palpitations and peripheral edema.   Gastrointestinal:  Negative for abdominal pain, constipation, diarrhea, heartburn, hematochezia and nausea.   Breasts:  Negative for tenderness, breast mass and discharge.   Genitourinary:  Negative for dysuria, frequency, genital sores, hematuria, pelvic pain, urgency, vaginal bleeding and vaginal discharge.   Musculoskeletal:  Negative for arthralgias, joint swelling and myalgias.   Skin:  Negative for rash.   Neurological:  Negative for dizziness, weakness, headaches and paresthesias.   Psychiatric/Behavioral:  Negative for mood changes. The patient is not nervous/anxious.       Rare numbness in hands in middle of night if  sleeping hands. No sx now and resolves quickly   Not checking sugars at home   Hx DIXIE.   Has been of CPAP for about 5 years since moving and misplacing it.  Declines treatment at this time  Some stress as patient recently lost her job but was transitioned into a new job within the company that she will be starting next Monday.  Sad that she will be missing her coworkers and previous job.  Declines need for counseling, medication  Weight up 2 pounds from  "1 year ago      OBJECTIVE:   /72   Pulse 82   Temp 98.4  F (36.9  C) (Temporal)   Ht 1.619 m (5' 3.75\")   Wt 101.4 kg (223 lb 9.6 oz)   LMP 10/26/2006   SpO2 98%   BMI 38.68 kg/m   Estimated body mass index is 38.68 kg/m  as calculated from the following:    Height as of this encounter: 1.619 m (5' 3.75\").    Weight as of this encounter: 101.4 kg (223 lb 9.6 oz).  Physical Exam  General appearance -  alert, no distress.  Occasionally tearful talking about her recent job  Skin - No rashes or lesions.  Head - normocephalic, atraumatic  Eyes - TAMIR, EOMI, fundi exam with nondilated pupils negative.  Ears - External ears normal. Canals clear. TM's normal.  Nose/Sinuses - Nares normal. Septum midline. Mucosa normal. No drainage or sinus tenderness.  Oropharynx - No erythema, no adenopathy, no exudates.  Neck - Supple without adenopathy or thyromegaly. No bruits.  Lungs - Clear to auscultation without wheezes/rhonchi.  Heart - Regular rate and rhythm without murmurs, clicks, or gallops.  Nodes - No supraclavicular, axillary, or inguinal adenopathy palpable.  Breasts - deferred  Abdomen - Abdomen obese, soft, non-tender. BS normal. No masses or hepatosplenomegaly palpable. No bruits.  Extremities -No cyanosis, clubbing or edema.    Musculoskeletal - Spine ROM normal. Muscular strength intact.   Peripheral pulses - radial=4/4, femoral=4/4, posterior tibial=4/4, dorsalis pedis=4/4,  Neuro - Gait normal. Reflexes normal and symmetric. Sensation grossly WNL.  Genital/Rectal - deferred          ASSESSMENT / PLAN:   1. Encounter for routine adult medical exam with abnormal findings  See plan discussion below for healthcare maintenance recommendations.  Otherwise up-to-date for age.  Patient currently participating in RSV study and will be given another vaccination soon  that is blinded. States she will learn if received actual vaccines in 1 year    2. Type 2 diabetes mellitus without complication, without long-term " current use of insulin (H)  Not checking blood sugars.  Previously controlled.  Labs today as ordered for follow-up.  Moderate compliance with diabetic diet  - Hemoglobin A1c; Future  - Comprehensive metabolic panel; Future  - Albumin Random Urine Quantitative with Creat Ratio; Future  - Ferritin; Future  - Hemoglobin A1c  - Comprehensive metabolic panel  - Albumin Random Urine Quantitative with Creat Ratio  - Ferritin  - FOOT EXAM    3. Severe obesity with body mass index (BMI) of 35.0 to 39.9 with serious comorbidity (H)  Weight increased.  Contributing to hypertension, hyperlipidemia, diabetes.  Declines GLP-1 agonist.  Counseled regarding calorie/carbohydrate reduction and increasing exercise    4. Hyperlipidemia with target LDL less than 100  Previously controlled.  Continue statin therapy.  Lab today as ordered.  Medication refilled  - Comprehensive metabolic panel; Future  - Lipid panel reflex to direct LDL Non-fasting; Future  - pravastatin (PRAVACHOL) 20 MG tablet; Take 1 tablet (20 mg) by mouth daily  Dispense: 90 tablet; Refill: 3  - Comprehensive metabolic panel  - Lipid panel reflex to direct LDL Non-fasting    5. Essential hypertension, benign  Controlled.  Continue current medication.  Labs as ordered.  Medication refilled  - Comprehensive metabolic panel; Future  - amLODIPine (NORVASC) 10 MG tablet; Take 1 tablet (10 mg) by mouth daily  Dispense: 90 tablet; Refill: 3  - losartan-hydrochlorothiazide (HYZAAR) 100-25 MG tablet; Take 1 tablet by mouth daily  Dispense: 90 tablet; Refill: 3  - Comprehensive metabolic panel    6. Leg cramp  Rare leg cramps at night.  Denies back pain.  Cramps not occurring with exertion such as walking.  Lab was ordered to rule out iron deficiency as cause.  Symptoms not frequent enough that patient wishes to consider daily Requip or Mirapex therapy  - Ferritin; Future  - Ferritin    7. Sleep apnea, unspecified type  Untreated sleep apnea.  Does not remember where or when  specifically she had previous sleep study.  Has lost her CPAP machine.  Has some daytime fatigue.  Will refer to [facility] for further evaluation and treatment.  Will likely need repeat sleep study  - Adult Sleep Eval & Management  Referral; Future    8. Need for prophylactic vaccination with tetanus-diphtheria (Td)  - TD,PF 7+(TENIVAC)    9. Encounter for screening mammogram for breast cancer  Due for mammogram 11/18/2023 or later.  Asymptomatic.  - *MA Screening Digital Bilateral; Future      Patient has been advised of split billing requirements and indicates understanding: Yes      COUNSELING:  Reviewed preventive health counseling, as reflected in patient instructions        She reports that she has never smoked. She has never used smokeless tobacco.      Appropriate preventive services were discussed with this patient, including applicable screening as appropriate for fall prevention, nutrition, physical activity, Tobacco-use cessation, weight loss and cognition.  Checklist reviewing preventive services available has been given to the patient.    Reviewed patients plan of care and provided an AVS. The Basic Care Plan (routine screening as documented in Health Maintenance) for Kristen meets the Care Plan requirement. This Care Plan has been established and reviewed with the Patient.     PLAN:  Continue current medications  Prescriptions refilled.    Labs today as ordered  Mammogram  11/18/23 or later    This will be done at the [facility]. Call [phone] or use [employer] to schedule.   Vaccinations: Tetanus (Td) vaccine   I would recommend a covid booster vaccination. You may have it done at any pharmacy. If you wish to have it done at a [facility] pharmacy, then go to [email or web address] to schedule a vaccination appointment   Monitor mood/mental health over next few weeks. If feel need counseling, then let me know  Referral to  Sleep clinic re obstructive sleep apnea.   They will call to schedule. If you don't hear from a representative within 2 business days, please call [phone].   Reduce calorie/carbohydrate (sugar, bread, potato, pasta, rice, alcohol etc)  intake in diet.  Increase color on your plate with vegetables. Increase  frequency of walking or other aerobic exercise as able (goal is daily)  Pt was informed regarding extra E&M billing for management of new or established medical issues not related to today's wellness/screening visit        Radames Reed MD  [facility]    Identified Health Risks:  I have reviewed Opioid Use Disorder and Substance Use Disorder risk factors and  NA  She is at risk for lack of exercise and has been provided with information to increase physical activity for the benefit of her well-being.  The patient was counseled and encouraged to consider modifying their diet and eating habits. She was provided with information on recommended healthy diet options.  She is at risk for lack of exercise and has been provided with information to increase physical activity for the benefit of her well-being.  The patient was counseled and encouraged to consider modifying their diet and eating habits. She was provided with information on recommended healthy diet options.

## 2023-10-13 NOTE — PATIENT INSTRUCTIONS
Continue current medications  Prescriptions refilled.    Labs today as ordered  Mammogram  11/18/23 or later    This will be done at the [facility]. Call [phone] or use [employer] to schedule.   Vaccinations: Tetanus (Td) vaccine   I would recommend a covid booster vaccination. You may have it done at any pharmacy. If you wish to have it done at a [place] pharmacy, then go to [email or web address] to schedule a vaccination appointment   Monitor mood/mental health over next few weeks. If feel need counseling, then let me know  Referral to  Sleep clinic re obstructive sleep apnea.  They will call to schedule. If you don't hear from a representative within 2 business days, please call [phone].   Reduce calorie/carbohydrate (sugar, bread, potato, pasta, rice, alcohol etc)  intake in diet.  Increase color on your plate with vegetables. Increase  frequency of walking or other aerobic exercise as able (goal is daily)  Pt was informed regarding extra E&M billing for management of new or established medical issues not related to today's wellness/screening visit      Patient Education   Personalized Prevention Plan  You are due for the preventive services outlined below.  Your care team is available to assist you in scheduling these services.  If you have already completed any of these items, please share that information with your care team to update in your medical record.  Health Maintenance Due   Topic Date Due     Hepatitis B Vaccine (1 of 3 - Risk 3-dose series) Never done     RSV VACCINE 60+ (1 - 1-dose 60+ series) Never done     Osteoporosis Screening  08/04/2023     Annual Wellness Visit  08/18/2023     ANNUAL REVIEW OF HM ORDERS  08/18/2023     COVID-19 Vaccine (5 - 2023-24 season) 09/01/2023     Learning About Being Physically Active  What is physical activity?     Being physically active means doing any kind of activity that gets your body moving.  The types of physical activity that  "can help you get fit and stay healthy include:  Aerobic or \"cardio\" activities. These make your heart beat faster and make you breathe harder, such as brisk walking, riding a bike, or running. They strengthen your heart and lungs and build up your endurance.  Strength training activities. These make your muscles work against, or \"resist,\" something. Examples include lifting weights or doing push-ups. These activities help tone and strengthen your muscles and bones.  Stretches. These let you move your joints and muscles through their full range of motion. Stretching helps you be more flexible.  Reaching a balance between these three types of physical activity is important because each one contributes to your overall fitness.  What are the benefits of being active?  Being active is one of the best things you can do for your health. It helps you to:  Feel stronger and have more energy to do all the things you like to do.  Focus better at school or work.  Feel, think, and sleep better.  Reach and stay at a healthy weight.  Lose fat and build lean muscle.  Lower your risk for serious health problems, including diabetes, heart attack, high blood pressure, and some cancers.  Keep your heart, lungs, bones, muscles, and joints strong and healthy.  How can you make being active part of your life?  Start slowly. Make it your long-term goal to get at least 30 minutes of exercise on most days of the week. Walking is a good choice. You also may want to do other activities, such as running, swimming, cycling, or playing tennis or team sports.  Pick activities that you like--ones that make your heart beat faster, your muscles stronger, and your muscles and joints more flexible. If you find more than one thing you like doing, do them all. You don't have to do the same thing every day.  Get your heart pumping every day. Any activity that makes your heart beat faster and keeps it at that rate for a while counts.  Here are some great " "ways to get your heart beating faster:  Go for a brisk walk, run, or bike ride.  Go for a hike or swim.  Go in-line skating.  Play a game of touch football, basketball, or soccer.  Ride a bike.  Play tennis or racquetball.  Climb stairs.  Even some household chores can be aerobic--just do them at a faster pace. Vacuuming, raking or mowing the lawn, sweeping the garage, and washing and waxing the car all can help get your heart rate up.  Strengthen your muscles during the week. You don't have to lift heavy weights or grow big, bulky muscles to get stronger. Doing a few simple activities that make your muscles work against, or \"resist,\" something can help you get stronger.  For example, you can:  Do push-ups or sit-ups, which use your own body weight as resistance.  Lift weights or dumbbells or use stretch bands at home or in a gym or community center.  Stretch your muscles often. Stretching will help you as you become more active. It can help you stay flexible, loosen tight muscles, and avoid injury. It can also help improve your balance and posture and can be a great way to relax.  Be sure to stretch the muscles you'll be using when you work out. It's best to warm your muscles slightly before you stretch them. Walk or do some other light aerobic activity for a few minutes, and then start stretching.  When you stretch your muscles:  Do it slowly. Stretching is not about going fast or making sudden movements.  Don't push or bounce during a stretch.  Hold each stretch for at least 15 to 30 seconds, if you can. You should feel a stretch in the muscle, but not pain.  Breathe out as you do the stretch. Then breathe in as you hold the stretch. Don't hold your breath.  If you're worried about how more activity might affect your health, have a checkup before you start. Follow any special advice your doctor gives you for getting a smart start.  Where can you learn more?  Go to https://www.[employer].net/patiented  Enter W332 " "in the search box to learn more about \"Learning About Being Physically Active.\"  Current as of: October 10, 2022               Content Version: 13.7    3093-7382 [employer].   Care instructions adapted under license by your healthcare professional. If you have questions about a medical condition or this instruction, always ask your healthcare professional. [employer] disclaims any warranty or liability for your use of this information.      Learning About Dietary Guidelines  What are the Dietary Guidelines for Americans?     Dietary Guidelines for Americans provide tips for eating well and staying healthy. This helps reduce the risk for long-term (chronic) diseases.  These guidelines recommend that you:  Eat and drink the right amount for you. The U.S. government's food guide is called MyPlate. It can help you make your own well-balanced eating plan.  Try to balance your eating with your activity. This helps you stay at a healthy weight.  Drink alcohol in moderation, if at all.  Limit foods high in salt, saturated fat, trans fat, and added sugar.  These guidelines are from the U.S. Department of Agriculture and the U.S. Department of Health and Human Services. They are updated every 5 years.  What is MyPlate?  MyPlate is the U.S. government's food guide. It can help you make your own well-balanced eating plan. A balanced eating plan means that you eat enough, but not too much, and that your food gives you the nutrients you need to stay healthy.  MyPlate focuses on eating plenty of whole grains, fruits, and vegetables, and on limiting fat and sugar. It is available online at www.ChooseMyPlate.gov.  How can you get started?  If you're trying to eat healthier, you can slowly change your eating habits over time. You don't have to make big changes all at once. Start by adding one or two healthy foods to your meals each day.  Grains  Choose whole-grain breads and cereals and whole-wheat pasta " "and whole-grain crackers.  Vegetables  Eat a variety of vegetables every day. They have lots of nutrients and are part of a heart-healthy diet.  Fruits  Eat a variety of fruits every day. Fruits contain lots of nutrients. Choose fresh fruit instead of fruit juice.  Protein foods  Choose fish and lean poultry more often. Eat red meat and fried meats less often. Dried beans, tofu, and nuts are also good sources of protein.  Dairy  Choose low-fat or fat-free products from this food group. If you have problems digesting milk, try eating cheese or yogurt instead.  Fats and oils  Limit fats and oils if you're trying to cut calories. Choose healthy fats when you cook. These include canola oil and olive oil.  Where can you learn more?  Go to https://www.[employer].net/patiented  Enter D676 in the search box to learn more about \"Learning About Dietary Guidelines.\"  Current as of: March 1, 2023               Content Version: 13.7    1263-5409 [employer].   Care instructions adapted under license by your healthcare professional. If you have questions about a medical condition or this instruction, always ask your healthcare professional. [employer] disclaims any warranty or liability for your use of this information.         Patient Education   Personalized Prevention Plan  You are due for the preventive services outlined below.  Your care team is available to assist you in scheduling these services.  If you have already completed any of these items, please share that information with your care team to update in your medical record.  Health Maintenance Due   Topic Date Due     Hepatitis B Vaccine (1 of 3 - Risk 3-dose series) Never done     RSV VACCINE 60+ (1 - 1-dose 60+ series) Never done     Osteoporosis Screening  08/04/2023     Annual Wellness Visit  08/18/2023     ANNUAL REVIEW OF HM ORDERS  08/18/2023     COVID-19 Vaccine (5 - 2023-24 season) 09/01/2023     Learning About Being Physically " "Active  What is physical activity?     Being physically active means doing any kind of activity that gets your body moving.  The types of physical activity that can help you get fit and stay healthy include:  Aerobic or \"cardio\" activities. These make your heart beat faster and make you breathe harder, such as brisk walking, riding a bike, or running. They strengthen your heart and lungs and build up your endurance.  Strength training activities. These make your muscles work against, or \"resist,\" something. Examples include lifting weights or doing push-ups. These activities help tone and strengthen your muscles and bones.  Stretches. These let you move your joints and muscles through their full range of motion. Stretching helps you be more flexible.  Reaching a balance between these three types of physical activity is important because each one contributes to your overall fitness.  What are the benefits of being active?  Being active is one of the best things you can do for your health. It helps you to:  Feel stronger and have more energy to do all the things you like to do.  Focus better at school or work.  Feel, think, and sleep better.  Reach and stay at a healthy weight.  Lose fat and build lean muscle.  Lower your risk for serious health problems, including diabetes, heart attack, high blood pressure, and some cancers.  Keep your heart, lungs, bones, muscles, and joints strong and healthy.  How can you make being active part of your life?  Start slowly. Make it your long-term goal to get at least 30 minutes of exercise on most days of the week. Walking is a good choice. You also may want to do other activities, such as running, swimming, cycling, or playing tennis or team sports.  Pick activities that you like--ones that make your heart beat faster, your muscles stronger, and your muscles and joints more flexible. If you find more than one thing you like doing, do them all. You don't have to do the same thing " "every day.  Get your heart pumping every day. Any activity that makes your heart beat faster and keeps it at that rate for a while counts.  Here are some great ways to get your heart beating faster:  Go for a brisk walk, run, or bike ride.  Go for a hike or swim.  Go in-line skating.  Play a game of touch football, basketball, or soccer.  Ride a bike.  Play tennis or racquetball.  Climb stairs.  Even some household chores can be aerobic--just do them at a faster pace. Vacuuming, raking or mowing the lawn, sweeping the garage, and washing and waxing the car all can help get your heart rate up.  Strengthen your muscles during the week. You don't have to lift heavy weights or grow big, bulky muscles to get stronger. Doing a few simple activities that make your muscles work against, or \"resist,\" something can help you get stronger.  For example, you can:  Do push-ups or sit-ups, which use your own body weight as resistance.  Lift weights or dumbbells or use stretch bands at home or in a gym or community center.  Stretch your muscles often. Stretching will help you as you become more active. It can help you stay flexible, loosen tight muscles, and avoid injury. It can also help improve your balance and posture and can be a great way to relax.  Be sure to stretch the muscles you'll be using when you work out. It's best to warm your muscles slightly before you stretch them. Walk or do some other light aerobic activity for a few minutes, and then start stretching.  When you stretch your muscles:  Do it slowly. Stretching is not about going fast or making sudden movements.  Don't push or bounce during a stretch.  Hold each stretch for at least 15 to 30 seconds, if you can. You should feel a stretch in the muscle, but not pain.  Breathe out as you do the stretch. Then breathe in as you hold the stretch. Don't hold your breath.  If you're worried about how more activity might affect your health, have a checkup before you " "start. Follow any special advice your doctor gives you for getting a smart start.  Where can you learn more?  Go to https://www.[employer]net/patiented  Enter W332 in the search box to learn more about \"Learning About Being Physically Active.\"  Current as of: October 10, 2022               Content Version: 13.7    6005-9391 [employer].   Care instructions adapted under license by your healthcare professional. If you have questions about a medical condition or this instruction, always ask your healthcare professional. [employer] disclaims any warranty or liability for your use of this information.      Learning About Dietary Guidelines  What are the Dietary Guidelines for Americans?     Dietary Guidelines for Americans provide tips for eating well and staying healthy. This helps reduce the risk for long-term (chronic) diseases.  These guidelines recommend that you:  Eat and drink the right amount for you. The U.S. government's food guide is called MyPlate. It can help you make your own well-balanced eating plan.  Try to balance your eating with your activity. This helps you stay at a healthy weight.  Drink alcohol in moderation, if at all.  Limit foods high in salt, saturated fat, trans fat, and added sugar.  These guidelines are from the U.S. Department of Agriculture and the U.S. Department of Health and Human Services. They are updated every 5 years.  What is MyPlate?  MyPlate is the U.S. government's food guide. It can help you make your own well-balanced eating plan. A balanced eating plan means that you eat enough, but not too much, and that your food gives you the nutrients you need to stay healthy.  MyPlate focuses on eating plenty of whole grains, fruits, and vegetables, and on limiting fat and sugar. It is available online at www.ChooseMyPlate.gov.  How can you get started?  If you're trying to eat healthier, you can slowly change your eating habits over time. You don't have to " "make big changes all at once. Start by adding one or two healthy foods to your meals each day.  Grains  Choose whole-grain breads and cereals and whole-wheat pasta and whole-grain crackers.  Vegetables  Eat a variety of vegetables every day. They have lots of nutrients and are part of a heart-healthy diet.  Fruits  Eat a variety of fruits every day. Fruits contain lots of nutrients. Choose fresh fruit instead of fruit juice.  Protein foods  Choose fish and lean poultry more often. Eat red meat and fried meats less often. Dried beans, tofu, and nuts are also good sources of protein.  Dairy  Choose low-fat or fat-free products from this food group. If you have problems digesting milk, try eating cheese or yogurt instead.  Fats and oils  Limit fats and oils if you're trying to cut calories. Choose healthy fats when you cook. These include canola oil and olive oil.  Where can you learn more?  Go to https://www.[employer].net/patiented  Enter D676 in the search box to learn more about \"Learning About Dietary Guidelines.\"  Current as of: March 1, 2023               Content Version: 13.7    1467-1496 [employer]   Care instructions adapted under license by your healthcare professional. If you have questions about a medical condition or this instruction, always ask your healthcare professional. [employer] disclaims any warranty or liability for your use of this information.         "

## 2023-10-27 ENCOUNTER — TRANSFERRED RECORDS (OUTPATIENT)
Dept: HEALTH INFORMATION MANAGEMENT | Facility: CLINIC | Age: 68
End: 2023-10-27
Payer: COMMERCIAL

## 2023-11-20 ENCOUNTER — ANCILLARY PROCEDURE (OUTPATIENT)
Dept: MAMMOGRAPHY | Facility: CLINIC | Age: 68
End: 2023-11-20
Attending: INTERNAL MEDICINE
Payer: COMMERCIAL

## 2023-11-20 DIAGNOSIS — Z12.31 ENCOUNTER FOR SCREENING MAMMOGRAM FOR BREAST CANCER: ICD-10-CM

## 2023-11-20 PROCEDURE — 77067 SCR MAMMO BI INCL CAD: CPT | Mod: TC | Performed by: RADIOLOGY

## 2023-11-20 PROCEDURE — 77063 BREAST TOMOSYNTHESIS BI: CPT | Mod: TC | Performed by: RADIOLOGY

## 2023-11-21 ASSESSMENT — SLEEP AND FATIGUE QUESTIONNAIRES
HOW LIKELY ARE YOU TO NOD OFF OR FALL ASLEEP WHILE SITTING INACTIVE IN A PUBLIC PLACE: SLIGHT CHANCE OF DOZING
HOW LIKELY ARE YOU TO NOD OFF OR FALL ASLEEP WHILE SITTING AND READING: MODERATE CHANCE OF DOZING
HOW LIKELY ARE YOU TO NOD OFF OR FALL ASLEEP WHILE LYING DOWN TO REST IN THE AFTERNOON WHEN CIRCUMSTANCES PERMIT: SLIGHT CHANCE OF DOZING
HOW LIKELY ARE YOU TO NOD OFF OR FALL ASLEEP WHILE SITTING QUIETLY AFTER LUNCH WITHOUT ALCOHOL: WOULD NEVER DOZE
HOW LIKELY ARE YOU TO NOD OFF OR FALL ASLEEP WHILE WATCHING TV: SLIGHT CHANCE OF DOZING
HOW LIKELY ARE YOU TO NOD OFF OR FALL ASLEEP WHILE SITTING AND TALKING TO SOMEONE: WOULD NEVER DOZE
HOW LIKELY ARE YOU TO NOD OFF OR FALL ASLEEP IN A CAR, WHILE STOPPED FOR A FEW MINUTES IN TRAFFIC: WOULD NEVER DOZE
HOW LIKELY ARE YOU TO NOD OFF OR FALL ASLEEP WHEN YOU ARE A PASSENGER IN A CAR FOR AN HOUR WITHOUT A BREAK: SLIGHT CHANCE OF DOZING

## 2023-11-22 ENCOUNTER — OFFICE VISIT (OUTPATIENT)
Dept: SLEEP MEDICINE | Facility: CLINIC | Age: 68
End: 2023-11-22
Attending: INTERNAL MEDICINE
Payer: COMMERCIAL

## 2023-11-22 VITALS
OXYGEN SATURATION: 97 % | BODY MASS INDEX: 37.18 KG/M2 | HEART RATE: 64 BPM | WEIGHT: 217.8 LBS | DIASTOLIC BLOOD PRESSURE: 74 MMHG | SYSTOLIC BLOOD PRESSURE: 129 MMHG | HEIGHT: 64 IN

## 2023-11-22 DIAGNOSIS — R29.818 SUSPECTED SLEEP APNEA: Primary | ICD-10-CM

## 2023-11-22 DIAGNOSIS — R06.83 SNORING: ICD-10-CM

## 2023-11-22 DIAGNOSIS — I10 PRIMARY HYPERTENSION: ICD-10-CM

## 2023-11-22 DIAGNOSIS — G47.9 SLEEP DISTURBANCE: ICD-10-CM

## 2023-11-22 PROCEDURE — 99203 OFFICE O/P NEW LOW 30 MIN: CPT | Performed by: INTERNAL MEDICINE

## 2023-11-22 NOTE — PROGRESS NOTES
Sleep Consultation:    Date on this visit: 11/22/2023    Kristen Moser  is referred by Radames Reed for a sleep consultation.     Primary Physician: Radames Reed     Kristen Moser reports frequent snoring and poor quality of sleep for many years.     Her medical history significant for hypertension, diabetes mellitus type 2 and hypothyroidism.    Patient had a previous PSG in 2008 through [facility]. Mild to moderate obstructive sleep apena is reported with AHI of 14 per hor and RDI of 22 per hour.     She was prescribed CPAP therapy and apparently did benefit from CPAP treatment.  According to her, she used CPAP regularly for about 6 years.  After relocation, she lost her device and did not try to obtain it again.    Kristen does snore frequently. Patient does not have a bed partner and we do not have an accurate assessment of whether she has witnessed apneas.   Patient sleeps on her back and side. She has frequent morning dry mouth, denies no morning headaches and restless legs.     Kristen goes to sleep at 10:30 PM during the week. She wakes up at 6:30 AM. She falls asleep in 15-45 minutes.  Kristen denies difficulty falling asleep.  She wakes up 2-4 times a night for 10 minutes before falling back to sleep.  Kristen wakes up to go to the bathroom and uncertain reasons.  On weekends, Kristen goes to sleep at 10:30 PM.  She wakes up at 6:30 AM. She falls asleep in 30 minutes.  Patient gets an average of 7 hours of sleep per night.     Kristen denies any bruxism, sleep walking, sleep talking, dream enactment, sleep paralysis, cataplexy, and hypnogogic/hypnopompic hallucinations.    Kristen denies difficulty breathing through her nose.      Patient's [place] Sleepiness score 6/24 consistent with no daytime sleepiness.      Kristen naps 0 times per week. She takes no inadvertant naps.  She denies closing eyes, dozing, and falling asleep while driving. Patient was counseled on the importance of driving while alert, to pull over if  drowsy, or nap before getting into the vehicle if sleepy.      She uses 2 sodas/day. Last caffeine intake is usually before 7 PM.    Problem List:  Patient Active Problem List    Diagnosis Date Noted    Severe obesity with body mass index (BMI) of 35.0 to 39.9 with serious comorbidity (H) 07/09/2021     Priority: Medium    Hypothyroidism, unspecified type 11/06/2019     Priority: Medium    Colon polyp 01/24/2018     Priority: Medium     Return on 5 years for Colonoscopy          Type 2 diabetes mellitus without complication, without long-term current use of insulin (H) 09/19/2017     Priority: Medium    Vitamin D deficiency 11/10/2012     Priority: Medium    Advanced directives, counseling/discussion 01/12/2012     Priority: Medium     Advance Directive Problem List Overview:   Name Relationship Phone    Primary Health Care Agent            Alternative Health Care Agent          Discussed advance care planning with patient; information given to patient to review. 1/12/2012         Health Care Home 08/30/2011     Priority: Medium     X  DX V65.8 REPLACED WITH 84132 [facility]ALTH CARE HOME (04/08/2013)      Hyperlipidemia with target LDL less than 100      Priority: Medium     Diagnosis updated by automated process. Provider to review and confirm.      DIXIE (obstructive sleep apnea)      Priority: Medium    Elevated sedimentation rate 07/12/2003     Priority: Medium    Essential hypertension, benign 07/10/2003     Priority: Medium        Past Medical/Surgical History:  Past Medical History:   Diagnosis Date    Colon polyps  May 2011     Tubular adenoma    Elevated sedimentation rate     Essential hypertension, benign     Hemangioma of other sites     Hyperlipidemia LDL goal < 100     Iron deficiency anemia, unspecified     Mild major depression (H24)     DIXIE (obstructive sleep apnea)     not CPAP    Other specified disease of white blood cells     Postinflammatory pulmonary fibrosis (H)     Severe obesity (BMI 35.0-39.9) with  "comorbidity (H) 11/5/2018    Type 2 diabetes mellitus without complication (goal A1C<7) 10/17/2015    Unspecified breast disorder 11/04    cyst left breast s/p aspiration    Vitamin D deficiency 11/10/2012     Social History     Tobacco Use    Smoking status: Never    Smokeless tobacco: Never   Substance Use Topics    Alcohol use: Not Currently     Comment: few times a year    Drug use: No       Physical Examination:  Vitals: /74   Pulse 64   Ht 1.619 m (5' 3.75\")   Wt 98.8 kg (217 lb 12.8 oz)   LMP 10/26/2006   SpO2 97%   BMI 37.68 kg/m    BMI= Body mass index is 37.68 kg/m .  GENERAL APPEARANCE: healthy, alert, and no distress  HENT: oropharynx crowded  NEURO: mentation intact and speech normal  PSYCH: mentation appears normal and affect normal/bright  Mallampati Class: III.  Tonsillar Stage: 1  hidden by pillars.    Impression/Plan:    Probable obstructive sleep apnea  Hypertension     Patient is a 68 years old female, with a BMI of 37, medical comorbidity of hypertension, diabetes, who presents with history of frequent snoring, poor sleep quality and daytime fatigue.  Oropharynx Mallampati class III on examination.  There is an intermediate to high risk for obstructive sleep apnea, and an overnight sleep study is recommended for further evaluation.     Plan:     Home sleep apnea testing       She will follow up with me in approximately two weeks after her sleep study has been competed to review the results and discuss plan of care.       Polysomnography & HST reviewed.  Obstructive sleep apnea reviewed.  Complications of untreated sleep apnea were reviewed.    Dr. Buck Armando       CC: Radames Reed              "

## 2023-11-22 NOTE — NURSING NOTE
"Chief Complaint   Patient presents with    Sleep Problem     Wakes several times a night, snoring, dry mouth       Initial /74   Pulse 64   Ht 1.619 m (5' 3.75\")   Wt 98.8 kg (217 lb 12.8 oz)   LMP 10/26/2006   SpO2 97%   BMI 37.68 kg/m   Estimated body mass index is 37.68 kg/m  as calculated from the following:    Height as of this encounter: 1.619 m (5' 3.75\").    Weight as of this encounter: 98.8 kg (217 lb 12.8 oz).    Medication Reconciliation: complete  ESS 6  Neck circumference: 39 centimeters.  Ashlyn Shabazz MA       "

## 2023-12-28 ENCOUNTER — OFFICE VISIT (OUTPATIENT)
Dept: URGENT CARE | Facility: URGENT CARE | Age: 68
End: 2023-12-28
Payer: COMMERCIAL

## 2023-12-28 VITALS
TEMPERATURE: 99.1 F | RESPIRATION RATE: 12 BRPM | OXYGEN SATURATION: 97 % | DIASTOLIC BLOOD PRESSURE: 78 MMHG | HEART RATE: 75 BPM | SYSTOLIC BLOOD PRESSURE: 126 MMHG

## 2023-12-28 DIAGNOSIS — J02.0 STREP THROAT: ICD-10-CM

## 2023-12-28 DIAGNOSIS — R07.0 THROAT PAIN: Primary | ICD-10-CM

## 2023-12-28 LAB — DEPRECATED S PYO AG THROAT QL EIA: POSITIVE

## 2023-12-28 PROCEDURE — 99213 OFFICE O/P EST LOW 20 MIN: CPT | Performed by: FAMILY MEDICINE

## 2023-12-28 PROCEDURE — 87880 STREP A ASSAY W/OPTIC: CPT | Performed by: FAMILY MEDICINE

## 2023-12-28 RX ORDER — AMOXICILLIN 500 MG/1
500 CAPSULE ORAL 2 TIMES DAILY
Qty: 20 CAPSULE | Refills: 0 | Status: SHIPPED | OUTPATIENT
Start: 2023-12-28 | End: 2024-01-07

## 2023-12-28 NOTE — PROGRESS NOTES
SUBJECTIVE:Kristen Moser is a 68 year old female with a chief complaint of sore throat.    Onset of symptoms was 3 day(s) ago.    Course of illness: still present.    Predisposing factors include exposure to strep.    Past Medical History:   Diagnosis Date    Colon polyps  May 2011     Tubular adenoma    Elevated sedimentation rate     Essential hypertension, benign     Hemangioma of other sites     Hyperlipidemia LDL goal < 100     Iron deficiency anemia, unspecified     Mild major depression (H24)     DIXIE (obstructive sleep apnea)     not CPAP    Other specified disease of white blood cells     Postinflammatory pulmonary fibrosis (H)     Severe obesity (BMI 35.0-39.9) with comorbidity (H) 11/5/2018    Type 2 diabetes mellitus without complication (goal A1C<7) 10/17/2015    Unspecified breast disorder 11/04    cyst left breast s/p aspiration    Vitamin D deficiency 11/10/2012     Allergies   Allergen Reactions    Lisinopril      cough     Social History     Tobacco Use    Smoking status: Never    Smokeless tobacco: Never   Substance Use Topics    Alcohol use: Not Currently     Comment: few times a year       ROS:  SKIN: no rash  GI: no vomiting    OBJECTIVE:   /78   Pulse 75   Temp 99.1  F (37.3  C)   Resp 12   LMP 10/26/2006   SpO2 97% GENERAL APPEARANCE: healthy, alert and no distress  EYES: EOMI,  PERRL, conjunctiva clear  HENT: ear canals and TM's normal.  Nose normal.  Pharynx erythematous with some exudate noted.  RESP: lungs clear to auscultation - no rales, rhonchi or wheezes  SKIN: no suspicious lesions or rashes    Rapid Strep test is positive      ICD-10-CM    1. Throat pain  R07.0 Streptococcus A Rapid Screen w/Reflex to PCR - Clinic Collect      2. Strep throat  J02.0 amoxicillin (AMOXIL) 500 MG capsule        Symptomatic treat with gargles, lozenges, and OTC analgesic as needed.  Follow-up with primary clinic if not improving.

## 2024-01-16 ENCOUNTER — OFFICE VISIT (OUTPATIENT)
Dept: SLEEP MEDICINE | Facility: CLINIC | Age: 69
End: 2024-01-16
Payer: COMMERCIAL

## 2024-01-16 DIAGNOSIS — I10 PRIMARY HYPERTENSION: ICD-10-CM

## 2024-01-16 DIAGNOSIS — G47.9 SLEEP DISTURBANCE: ICD-10-CM

## 2024-01-16 DIAGNOSIS — R06.83 SNORING: ICD-10-CM

## 2024-01-16 DIAGNOSIS — R29.818 SUSPECTED SLEEP APNEA: ICD-10-CM

## 2024-01-16 PROCEDURE — G0399 HOME SLEEP TEST/TYPE 3 PORTA: HCPCS | Performed by: INTERNAL MEDICINE

## 2024-01-17 ENCOUNTER — DOCUMENTATION ONLY (OUTPATIENT)
Dept: SLEEP MEDICINE | Facility: CLINIC | Age: 69
End: 2024-01-17
Payer: COMMERCIAL

## 2024-01-17 NOTE — NURSING NOTE
Pt returned HST device. It was downloaded and forwarded data to the clinical specialist for scoring.  Apryl Robles CMA on 1/17/2024 at 8:59 AM    
,

## 2024-01-17 NOTE — PROGRESS NOTES
HST POST-STUDY QUESTIONNAIRE    What time did you go to bed?  9:45  How long do you think it took to fall asleep?  20 min  What time did you wake up to start the day?  5:45  Did you get up during the night at all?  Yes  If you woke up, do you remember approximately what time(s)? 2:38  Did you have any difficulty with the equipment?  No  Did you us any type of treatment with this study?  None  Was the head of the bed elevated? Yes  Did you sleep in a recliner?  No  Did you stop using CPAP at least 3 days before this test?  NA  Any other information you'd like us to know?

## 2024-01-19 ENCOUNTER — MYC MEDICAL ADVICE (OUTPATIENT)
Dept: SLEEP MEDICINE | Facility: CLINIC | Age: 69
End: 2024-01-19
Payer: COMMERCIAL

## 2024-01-19 DIAGNOSIS — G47.33 OSA (OBSTRUCTIVE SLEEP APNEA): Primary | ICD-10-CM

## 2024-01-19 NOTE — PROGRESS NOTES
This HSAT was performed using a Noxturnal T3 device which recorded snore, sound, movement activity, body position, nasal pressure, oronasal thermal airflow, pulse, oximetry and both chest and abdominal respiratory effort. HSAT data was restricted to the time patient states they were in bed.     HSAT was scored using 1B 4% hypopnea rule.     HST AHI (Non-PAT): 13.6  Snoring was reported as mild.  Time with SpO2 below 89% was 22.7 minutes.   Overall signal quality was good.     Pt will follow up with sleep provider to determine appropriate therapy.

## 2024-01-19 NOTE — PROCEDURES
"HOME SLEEP STUDY INTERPRETATION        Patient: Kristen Moser  MRN: [id number]  YOB: 1955  Study Date: 2024  PCP/Referring Provider: Radames Reed;   Ordering Provider: Buck Armando MD       Indications for Home Study: Kristen Moser is a 68 year old female who presents with symptoms suggestive of obstructive sleep apnea.    Estimated body mass index is 37.68 kg/m  as calculated from the following:    Height as of 23: 1.619 m (5' 3.75\").    Weight as of 23: 98.8 kg (217 lb 12.8 oz).  Total score - [place]: 6 (2023  9:55 AM)  STOP-BAN/8        Data: A full night home sleep study was performed recording the standard physiologic parameters including body position, movement, sound, nasal pressure, thermal oral airflow, chest and abdominal movements with respiratory inductance plethysmography, and oxygen saturation by pulse oximetry. Pulse rate was estimated by oximetry recording. This study was considered adequate based on > 4 hours of quality oximetry and respiratory recording. As specified by the AASM Manual for the Scoring of Sleep and Associated events, version 2.3, Rule VIII.D 1B, 4% oxygen desaturation scoring for hypopneas is used as a standard of care on all home sleep apnea testing.        Analysis Time:  457.5 minutes        Respiration:   Sleep Associated Hypoxemia: sustained hypoxemia was present. Baseline oxygen saturation was 96%.  Time with saturation less than or equal to 88% was 22.7 minutes. The lowest oxygen saturation was 74%.   Snoring: Snoring was present.  Respiratory events: The home study revealed a presence of 49 obstructive apneas and 0 mixed and central apneas. There were 54 hypopneas resulting in a combined apnea/hypopnea index [AHI] of 13.6 events per hour.  AHI was 14.3 per hour supine, N/A per hour prone, 15.9 per hour on left side, and 7.9 per hour on right side.   Pattern: Excluding events noted above, respiratory rate and pattern was " Normal.      Position: Percent of time spent: supine - 64.9%, prone - 0%, on left - 18.2%, on right - 16.6%.      Heart Rate: By pulse oximetry normal rate was noted.       Assessment:   Mild obstructive sleep apnea.  Sleep associated hypoxemia was present.  Intermittent clusters of sleep apnea events and oxygen desaturations suggest possible REM sleep related disease.    Recommendations:  Depending on clinical indications for treatment of mild obstructive sleep apnea, following therapy options can be considered.  Patient can consider oral appliance therapy with referral to dental sleep medicine for treatment of mild obstructive sleep apnea.  If there is excessive daytime sleepiness or other qualified medical comorbidity, consider auto titrating CPAP therapy for treatment.  Weight management.        Diagnosis Code(s): Obstructive Sleep Apnea G47.33, Hypoxemia G47.36    Electronically signed by: Buck Armando MD, January 19, 2024   Diplomate, American Board of Psychiatry and Neurology, Sleep Medicine

## 2024-02-06 ENCOUNTER — DOCUMENTATION ONLY (OUTPATIENT)
Dept: SLEEP MEDICINE | Facility: CLINIC | Age: 69
End: 2024-02-06

## 2024-02-06 ENCOUNTER — APPOINTMENT (OUTPATIENT)
Dept: SLEEP MEDICINE | Facility: CLINIC | Age: 69
End: 2024-02-06
Payer: COMMERCIAL

## 2024-02-06 DIAGNOSIS — G47.33 OSA (OBSTRUCTIVE SLEEP APNEA): Primary | ICD-10-CM

## 2024-02-06 DIAGNOSIS — I10 PRIMARY HYPERTENSION: ICD-10-CM

## 2024-02-06 NOTE — PROGRESS NOTES
Patient was offered choice of vendor and chose [facility].  Patient Kristen Moser was set up at [facility]  on February 6, 2024. Patient received a Resmed Airsense 10. Pressures were set at  5-15 cm H2O. Patient s ramp is 5 cm H2O for auto and FLEX/EPR is EPR, 2. Patient received a Resmed mask name: Airfit N30i nasal mask size standard (with additional small P30i pillow), heated tubing and heated humidifier. Patient does not need to meet compliance. Patient has a follow up on 5/2/2024 with Dr. Armando.    Sylvie Rodriguez

## 2024-02-09 ENCOUNTER — DOCUMENTATION ONLY (OUTPATIENT)
Dept: SLEEP MEDICINE | Facility: CLINIC | Age: 69
End: 2024-02-09
Payer: COMMERCIAL

## 2024-02-09 NOTE — PROGRESS NOTES
3 day Sleep therapy management telephone visit    Diagnostic AHI:  13.6 HST    Confirmed with patient at time of call- Yes Patient is still interested in STM service       Subjective measures:  Pt reports she has had terrible dry mouth from CPAP. Checked her DL and she has extreme mouth leak. Discussed management and she wants to try a FFM. [facility]  Pt was given my contact information and instructed to reach out with any questions or concerns.       Order settings:  CPAP MIN CPAP MAX   5 cm H2O 15 cm H2O       Device settings:  CPAP MIN CPAP MAX EPR RESMED SOFT RESPONSE SETTING   5.0 cm  H20 15.0 cm  H20 TWO OFF       Compliance 100 %    Assessment: Nightly usage over four hours     Action plan: Patient to have 14 day STM visit.     Patient has the following upcoming sleep appts:  Future Sleep Appointments         Provider Department    5/2/2024 3:00 PM (Arrive by 2:45 PM) Buck Armando MD [facility]            Replacement device: No  STM ordered by provider: Yes     Total time spent on accessing and  interpreting remote patient PAP therapy data  10 minutes    Total time spent counseling, coaching  and reviewing PAP therapy data with patient  5 minutes    [id number] no

## 2024-02-13 ENCOUNTER — DOCUMENTATION ONLY (OUTPATIENT)
Dept: SLEEP MEDICINE | Facility: CLINIC | Age: 69
End: 2024-02-13
Payer: COMMERCIAL

## 2024-02-13 NOTE — PROGRESS NOTES
Patient came to [facility]  for mask fitting appointment on February 13, 2024. Patient requested to switch masks because oral breathing. Patient has used a bandaid on her lips the past few nights to help her keep her mouth closed while wearing the Airfit N30i nasal mask, and this has greatly improved her mask seal, but she wants to try a full face mask and decide what she likes better (full face vs. Nasal mask with bandaid on mouth.) Discussed the following masks: Airfit F20 small, Airtouch F20 small, Vitera small. Patient selected a Resmed mask name: Airfit F20 full face mask size small.

## 2024-02-21 ENCOUNTER — DOCUMENTATION ONLY (OUTPATIENT)
Dept: SLEEP MEDICINE | Facility: CLINIC | Age: 69
End: 2024-02-21
Payer: COMMERCIAL

## 2024-02-21 NOTE — PROGRESS NOTES
14  DAY STM VISIT    Diagnostic AHI:  13.6 HST    Subjective measures:   Spoke with patient she switched to a full face mask and didn't like it.  She is back to using her N30I pillow mask medical tape. She is still getting a dry mouth.  We talked about using a Biotene mouth wash and Xylimelts.     Assessment: Pt meeting objective benchmarks.  Patient failing following subjective benchmarks: dryness      Action plan: pt to have 30 day STM visit.      Device type: Auto-CPAP    PAP settings: CPAP min 5.0 cm  H20       CPAP max 15.0 cm  H20      95th% pressure 12.7 cm  H20        RESMED EPR level Setting: TWO    RESMED Soft response setting:  OFF    Mask type:  Per patient choice    Objective measures: 14 day rolling measures      Compliance  100 %      Leak  32.14  lpm  last  upload      AHI 2.43   last  upload      Average number of minutes 525      Objective measure goal  Compliance   Goal >70%  Leak   Goal < 24 lpm  AHI  Goal < 5  Usage  Goal >240        Total time spent on accessing and interpreting remote patient PAP therapy data  10 minutes    Total time spent counseling, coaching  and reviewing PAP therapy data with patient  5 minutes    [id number]  [id number]  no (3 day STM)

## 2024-04-25 ASSESSMENT — SLEEP AND FATIGUE QUESTIONNAIRES
HOW LIKELY ARE YOU TO NOD OFF OR FALL ASLEEP WHEN YOU ARE A PASSENGER IN A CAR FOR AN HOUR WITHOUT A BREAK: MODERATE CHANCE OF DOZING
HOW LIKELY ARE YOU TO NOD OFF OR FALL ASLEEP WHILE SITTING AND TALKING TO SOMEONE: WOULD NEVER DOZE
HOW LIKELY ARE YOU TO NOD OFF OR FALL ASLEEP WHILE WATCHING TV: SLIGHT CHANCE OF DOZING
HOW LIKELY ARE YOU TO NOD OFF OR FALL ASLEEP WHILE SITTING INACTIVE IN A PUBLIC PLACE: SLIGHT CHANCE OF DOZING
HOW LIKELY ARE YOU TO NOD OFF OR FALL ASLEEP IN A CAR, WHILE STOPPED FOR A FEW MINUTES IN TRAFFIC: WOULD NEVER DOZE
HOW LIKELY ARE YOU TO NOD OFF OR FALL ASLEEP WHILE LYING DOWN TO REST IN THE AFTERNOON WHEN CIRCUMSTANCES PERMIT: SLIGHT CHANCE OF DOZING
HOW LIKELY ARE YOU TO NOD OFF OR FALL ASLEEP WHILE SITTING QUIETLY AFTER LUNCH WITHOUT ALCOHOL: WOULD NEVER DOZE
HOW LIKELY ARE YOU TO NOD OFF OR FALL ASLEEP WHILE SITTING AND READING: HIGH CHANCE OF DOZING

## 2024-05-02 ENCOUNTER — OFFICE VISIT (OUTPATIENT)
Dept: SLEEP MEDICINE | Facility: CLINIC | Age: 69
End: 2024-05-02
Payer: COMMERCIAL

## 2024-05-02 VITALS
SYSTOLIC BLOOD PRESSURE: 130 MMHG | DIASTOLIC BLOOD PRESSURE: 78 MMHG | HEART RATE: 72 BPM | WEIGHT: 217 LBS | OXYGEN SATURATION: 98 % | HEIGHT: 64 IN | BODY MASS INDEX: 37.05 KG/M2

## 2024-05-02 DIAGNOSIS — G47.33 OSA (OBSTRUCTIVE SLEEP APNEA): Primary | ICD-10-CM

## 2024-05-02 PROCEDURE — 99213 OFFICE O/P EST LOW 20 MIN: CPT | Performed by: INTERNAL MEDICINE

## 2024-05-02 NOTE — NURSING NOTE
"Chief Complaint   Patient presents with    CPAP Follow Up       Initial /78   Pulse 72   Ht 1.619 m (5' 3.75\")   Wt 98.4 kg (217 lb)   LMP 10/26/2006   SpO2 98%   BMI 37.54 kg/m   Estimated body mass index is 37.54 kg/m  as calculated from the following:    Height as of this encounter: 1.619 m (5' 3.75\").    Weight as of this encounter: 98.4 kg (217 lb).    Medication Reconciliation: complete  ESS 8  Tati Temple MA   "

## 2024-05-02 NOTE — PROGRESS NOTES
Obstructive Sleep Apnea - PAP Follow-Up Visit:    Chief Complaint   Patient presents with    CPAP Follow Up     Kristen Moser comes in today for follow-up of their mild sleep apnea, managed with CPAP.     HST on 1/16/2024 at a weight of 270 pounds showed an apnea-hypopnea index of 13.6/h.  AHI was 14.3 per hour supine, N/A per hour prone, 15.9 per hour on left side, and 7.9 per hour on right side. Baseline oxygen saturation was 96%.  Time with saturation less than or equal to 88% was 22.7 minutes. The lowest oxygen saturation was 74%.     Do you use a CPAP Machine at home: Yes  Overall, on a scale of 0-10 how would you rate your CPAP (0 poor, 10 great): 5  Is your mask comfortable: Yes  If not, why:    Is you mask leaking: No  If yes, where do you feel it:    How many night per week does the mask leak (0-7):    Do you notice snoring with mask on: No  Do you notice gasping arousals with mask on: Yes  Are you having significant oral or nasal dryness: Yes  Is the pressure setting comfortable:    In not, why:    What type of mask do you use: Nasal Pillow  What is your typical bedtime: 10  How long does it take you to go to sleep on PAP therapy: [id number]  What time do you typically get out of bed for the day:    How many hours on average per night are you using PAP therapy: 7  How many hours are you sleeping per night: 6  Do you feel well rested in the morning: No    ResMed     Auto-PAP 5.0 - 15.0 cmH2O 30 day usage data:    86% of days with > 4 hours of use. 1/30 days with no use.   Average use 408 minutes per day.   95%ile Leak 31.08 L/min.   CPAP 95% pressure 12.4 cm.   AHI 3.12 events per hour.     EPWORTH SLEEPINESS SCALE         4/25/2024     9:03 AM    [place] Sleepiness Scale ( JUANIS Horvath  [id number]<br>ESS - [place]/English - Final version - 21 Nov 07 - [facility] [place].)   Sitting and reading High chance of dozing   Watching TV Slight chance of dozing   Sitting, inactive in a public place (e.g. a theatre or a  "meeting) Slight chance of dozing   As a passenger in a car for an hour without a break Moderate chance of dozing   Lying down to rest in the afternoon when circumstances permit Slight chance of dozing   Sitting and talking to someone Would never doze   Sitting quietly after a lunch without alcohol Would never doze   In a car, while stopped for a few minutes in traffic Would never doze   [place] Score (MC) 8   [place] Score (Sleep) 8       INSOMNIA SEVERITY INDEX (AARON)          4/25/2024     8:58 AM   Insomnia Severity Index (AARON)   Difficulty falling asleep 1   Difficulty staying asleep 2   Problems waking up too early 1   How SATISFIED/DISSATISFIED are you with your CURRENT sleep pattern? 2   How NOTICEABLE to others do you think your sleep problem is in terms of impairing the quality of your life? 0   How WORRIED/DISTRESSED are you about your current sleep problem? 2   To what extent do you consider your sleep problem to INTERFERE with your daily functioning (e.g. daytime fatigue, mood, ability to function at work/daily chores, concentration, memory, mood, etc.) CURRENTLY? 2   AARON Total Score 10       Guidelines for Scoring/Interpretation:  Total score categories:  0-7 = No clinically significant insomnia   8-14 = Subthreshold insomnia   15-21 = Clinical insomnia (moderate severity)  22-28 = Clinical insomnia (severe)  Used via courtesy of www.Fluidigmealth.va.gov with permission from Melvin Garcia PhD., [facility]    /78   Pulse 72   Ht 1.619 m (5' 3.75\")   Wt 98.4 kg (217 lb)   LMP 10/26/2006   SpO2 98%   BMI 37.54 kg/m      Impression/Plan:     Mild obstructive sleep apnea.    - Patient is using CPAP regularly and has benefited from treatment.  She reports that she is no longer tired as before.  She has oral dryness and will try increasing humidity.     -Review of download data from her device shows regular compliance and normal residual AHI at current therapy settings.    Plan:     Continue " auto PAP therapy 5-15 cm H2O    Kristen Moser will follow up in about 2 year(s).       Buck Armando MD    CC:  Radames Reed,

## 2024-05-02 NOTE — PATIENT INSTRUCTIONS
Your Body mass index is 37.54 kg/m .  Weight management is a personal decision.  If you are interested in exploring weight loss strategies, the following discussion covers the approaches that may be successful. Body mass index (BMI) is one way to tell whether you are at a healthy weight, overweight, or obese. It measures your weight in relation to your height.  A BMI of 18.5 to 24.9 is in the healthy range. A person with a BMI of 25 to 29.9 is considered overweight, and someone with a BMI of 30 or greater is considered obese. More than two-thirds of American adults are considered overweight or obese.  Being overweight or obese increases the risk for further weight gain. Excess weight may lead to heart disease and diabetes.  Creating and following plans for healthy eating and physical activity may help you improve your health.  Weight control is part of healthy lifestyle and includes exercise, emotional health, and healthy eating habits. Careful eating habits lifelong are the mainstay of weight control. Though there are significant health benefits from weight loss, long-term weight loss with diet alone may be very difficult to achieve- studies show long-term success with dietary management in less than 10% of people. Attaining a healthy weight may be especially difficult to achieve in those with severe obesity. In some cases, medications, devices and surgical management might be considered.  What can you do?  If you are overweight or obese and are interested in methods for weight loss, you should discuss this with your provider.   Consider reducing daily calorie intake by 500 calories.   Keep a food journal.   Avoiding skipping meals, consider cutting portions instead.    Diet combined with exercise helps maintain muscle while optimizing fat loss. Strength training is particularly important for building and maintaining muscle mass. Exercise helps reduce stress, increase energy, and improves fitness. Increasing  exercise without diet control, however, may not burn enough calories to loose weight.     Start walking three days a week 10-20 minutes at a time  Work towards walking thirty minutes five days a week   Eventually, increase the speed of your walking for 1-2 minutes at time    In addition, we recommend that you review healthy lifestyles and methods for weight loss available through the National Institutes of Health patient information sites:  http://win.niddk.nih.gov/publications/index.htm    And look into health and wellness programs that may be available through your health insurance provider, employer, local community center, or olga club.

## 2024-05-26 ENCOUNTER — HEALTH MAINTENANCE LETTER (OUTPATIENT)
Age: 69
End: 2024-05-26

## 2024-09-25 DIAGNOSIS — I10 ESSENTIAL HYPERTENSION, BENIGN: ICD-10-CM

## 2024-09-25 DIAGNOSIS — E78.5 HYPERLIPIDEMIA WITH TARGET LDL LESS THAN 100: ICD-10-CM

## 2024-09-25 RX ORDER — PRAVASTATIN SODIUM 20 MG
20 TABLET ORAL DAILY
Qty: 90 TABLET | Refills: 0 | Status: SHIPPED | OUTPATIENT
Start: 2024-09-25

## 2024-09-25 RX ORDER — AMLODIPINE BESYLATE 10 MG/1
10 TABLET ORAL DAILY
Qty: 90 TABLET | Refills: 0 | Status: SHIPPED | OUTPATIENT
Start: 2024-09-25

## 2024-09-25 RX ORDER — LOSARTAN POTASSIUM AND HYDROCHLOROTHIAZIDE 25; 100 MG/1; MG/1
1 TABLET ORAL DAILY
Qty: 90 TABLET | Refills: 0 | Status: SHIPPED | OUTPATIENT
Start: 2024-09-25

## 2024-09-26 DIAGNOSIS — E11.69 TYPE 2 DIABETES MELLITUS WITH OTHER SPECIFIED COMPLICATION (H): ICD-10-CM

## 2024-09-26 DIAGNOSIS — E78.5 HYPERLIPIDEMIA WITH TARGET LDL LESS THAN 100: Primary | ICD-10-CM

## 2024-09-26 DIAGNOSIS — E03.9 HYPOTHYROIDISM, UNSPECIFIED TYPE: ICD-10-CM

## 2024-09-26 DIAGNOSIS — I10 ESSENTIAL HYPERTENSION, BENIGN: ICD-10-CM

## 2024-09-26 RX ORDER — METFORMIN HCL 500 MG
TABLET, EXTENDED RELEASE 24 HR ORAL
Qty: 270 TABLET | Refills: 0 | Status: SHIPPED | OUTPATIENT
Start: 2024-09-26

## 2024-09-26 NOTE — TELEPHONE ENCOUNTER
Patient has follow-up appointment scheduled with me for 10/25/2024.  Please call patient and assist in scheduling a fasting blood and urine lab test appointment to be done in the week prior to appointment with me so I can review results with patient when seen back in clinic.  Future labs ordered.  Prescription refilled for 90 days

## 2024-09-27 NOTE — TELEPHONE ENCOUNTER
Called pt and scheduled fasting lab 10/17 and informed provider will go over results at [facility] 10/25  Sharifa [facility]

## 2024-10-09 ENCOUNTER — TRANSFERRED RECORDS (OUTPATIENT)
Dept: HEALTH INFORMATION MANAGEMENT | Facility: CLINIC | Age: 69
End: 2024-10-09
Payer: COMMERCIAL

## 2024-10-09 LAB — RETINOPATHY: NEGATIVE

## 2024-10-17 ENCOUNTER — LAB (OUTPATIENT)
Dept: LAB | Facility: CLINIC | Age: 69
End: 2024-10-17
Payer: COMMERCIAL

## 2024-10-17 DIAGNOSIS — I10 ESSENTIAL HYPERTENSION, BENIGN: ICD-10-CM

## 2024-10-17 DIAGNOSIS — E03.9 HYPOTHYROIDISM, UNSPECIFIED TYPE: ICD-10-CM

## 2024-10-17 DIAGNOSIS — E11.69 TYPE 2 DIABETES MELLITUS WITH OTHER SPECIFIED COMPLICATION (H): ICD-10-CM

## 2024-10-17 DIAGNOSIS — E78.5 HYPERLIPIDEMIA WITH TARGET LDL LESS THAN 100: ICD-10-CM

## 2024-10-17 LAB
ALBUMIN SERPL BCG-MCNC: 4.3 G/DL (ref 3.5–5.2)
ALP SERPL-CCNC: 62 U/L (ref 40–150)
ALT SERPL W P-5'-P-CCNC: 30 U/L (ref 0–50)
ANION GAP SERPL CALCULATED.3IONS-SCNC: 14 MMOL/L (ref 7–15)
AST SERPL W P-5'-P-CCNC: 35 U/L (ref 0–45)
BILIRUB SERPL-MCNC: 0.5 MG/DL
BUN SERPL-MCNC: 12.7 MG/DL (ref 8–23)
CALCIUM SERPL-MCNC: 9.6 MG/DL (ref 8.8–10.4)
CHLORIDE SERPL-SCNC: 101 MMOL/L (ref 98–107)
CHOLEST SERPL-MCNC: 138 MG/DL
CREAT SERPL-MCNC: 0.69 MG/DL (ref 0.51–0.95)
CREAT UR-MCNC: 61.2 MG/DL
EGFRCR SERPLBLD CKD-EPI 2021: >90 ML/MIN/1.73M2
ERYTHROCYTE [DISTWIDTH] IN BLOOD BY AUTOMATED COUNT: 14.8 % (ref 10–15)
EST. AVERAGE GLUCOSE BLD GHB EST-MCNC: 148 MG/DL
FASTING STATUS PATIENT QL REPORTED: YES
FASTING STATUS PATIENT QL REPORTED: YES
GLUCOSE SERPL-MCNC: 157 MG/DL (ref 70–99)
HBA1C MFR BLD: 6.8 % (ref 0–5.6)
HCO3 SERPL-SCNC: 28 MMOL/L (ref 22–29)
HCT VFR BLD AUTO: 38.5 % (ref 35–47)
HDLC SERPL-MCNC: 54 MG/DL
HGB BLD-MCNC: 12.6 G/DL (ref 11.7–15.7)
LDLC SERPL CALC-MCNC: 58 MG/DL
MCH RBC QN AUTO: 26.5 PG (ref 26.5–33)
MCHC RBC AUTO-ENTMCNC: 32.7 G/DL (ref 31.5–36.5)
MCV RBC AUTO: 81 FL (ref 78–100)
MICROALBUMIN UR-MCNC: <12 MG/L
MICROALBUMIN/CREAT UR: NORMAL MG/G{CREAT}
NONHDLC SERPL-MCNC: 84 MG/DL
PLATELET # BLD AUTO: 319 10E3/UL (ref 150–450)
POTASSIUM SERPL-SCNC: 3.8 MMOL/L (ref 3.4–5.3)
PROT SERPL-MCNC: 8 G/DL (ref 6.4–8.3)
RBC # BLD AUTO: 4.75 10E6/UL (ref 3.8–5.2)
SODIUM SERPL-SCNC: 143 MMOL/L (ref 135–145)
TRIGL SERPL-MCNC: 131 MG/DL
TSH SERPL DL<=0.005 MIU/L-ACNC: 3.15 UIU/ML (ref 0.3–4.2)
WBC # BLD AUTO: 9.4 10E3/UL (ref 4–11)

## 2024-10-17 PROCEDURE — 80061 LIPID PANEL: CPT

## 2024-10-17 PROCEDURE — 85027 COMPLETE CBC AUTOMATED: CPT

## 2024-10-17 PROCEDURE — 82043 UR ALBUMIN QUANTITATIVE: CPT

## 2024-10-17 PROCEDURE — 80053 COMPREHEN METABOLIC PANEL: CPT

## 2024-10-17 PROCEDURE — 84443 ASSAY THYROID STIM HORMONE: CPT

## 2024-10-17 PROCEDURE — 82570 ASSAY OF URINE CREATININE: CPT

## 2024-10-17 PROCEDURE — 36415 COLL VENOUS BLD VENIPUNCTURE: CPT

## 2024-10-17 PROCEDURE — 83036 HEMOGLOBIN GLYCOSYLATED A1C: CPT

## 2024-10-25 ENCOUNTER — OFFICE VISIT (OUTPATIENT)
Dept: INTERNAL MEDICINE | Facility: CLINIC | Age: 69
End: 2024-10-25
Attending: INTERNAL MEDICINE
Payer: COMMERCIAL

## 2024-10-25 VITALS
OXYGEN SATURATION: 96 % | SYSTOLIC BLOOD PRESSURE: 132 MMHG | DIASTOLIC BLOOD PRESSURE: 68 MMHG | HEIGHT: 64 IN | BODY MASS INDEX: 36.6 KG/M2 | WEIGHT: 214.4 LBS | TEMPERATURE: 97.8 F | HEART RATE: 59 BPM

## 2024-10-25 DIAGNOSIS — Z00.01 ENCOUNTER FOR ROUTINE ADULT MEDICAL EXAM WITH ABNORMAL FINDINGS: ICD-10-CM

## 2024-10-25 DIAGNOSIS — Z23 FLU VACCINE NEED: ICD-10-CM

## 2024-10-25 DIAGNOSIS — Z23 NEED FOR COVID-19 VACCINE: ICD-10-CM

## 2024-10-25 DIAGNOSIS — E78.5 HYPERLIPIDEMIA WITH TARGET LDL LESS THAN 100: ICD-10-CM

## 2024-10-25 DIAGNOSIS — Z12.31 VISIT FOR SCREENING MAMMOGRAM: ICD-10-CM

## 2024-10-25 DIAGNOSIS — Z13.820 SCREENING FOR OSTEOPOROSIS: ICD-10-CM

## 2024-10-25 DIAGNOSIS — E66.01 SEVERE OBESITY WITH BODY MASS INDEX (BMI) OF 35.0 TO 39.9 WITH SERIOUS COMORBIDITY (H): ICD-10-CM

## 2024-10-25 DIAGNOSIS — I10 ESSENTIAL HYPERTENSION, BENIGN: ICD-10-CM

## 2024-10-25 DIAGNOSIS — E11.9 TYPE 2 DIABETES MELLITUS WITHOUT COMPLICATION, WITHOUT LONG-TERM CURRENT USE OF INSULIN (H): ICD-10-CM

## 2024-10-25 PROCEDURE — 99397 PER PM REEVAL EST PAT 65+ YR: CPT | Mod: 25 | Performed by: INTERNAL MEDICINE

## 2024-10-25 PROCEDURE — 91320 SARSCV2 VAC 30MCG TRS-SUC IM: CPT | Performed by: INTERNAL MEDICINE

## 2024-10-25 PROCEDURE — 90471 IMMUNIZATION ADMIN: CPT | Performed by: INTERNAL MEDICINE

## 2024-10-25 PROCEDURE — 90480 ADMN SARSCOV2 VAC 1/ONLY CMP: CPT | Performed by: INTERNAL MEDICINE

## 2024-10-25 PROCEDURE — 90662 IIV NO PRSV INCREASED AG IM: CPT | Performed by: INTERNAL MEDICINE

## 2024-10-25 PROCEDURE — 99207 PR FOOT EXAM NO CHARGE: CPT | Mod: 25 | Performed by: INTERNAL MEDICINE

## 2024-10-25 PROCEDURE — 99214 OFFICE O/P EST MOD 30 MIN: CPT | Mod: 25 | Performed by: INTERNAL MEDICINE

## 2024-10-25 RX ORDER — AMLODIPINE BESYLATE 10 MG/1
10 TABLET ORAL DAILY
Qty: 90 TABLET | Refills: 3 | Status: SHIPPED | OUTPATIENT
Start: 2024-10-25

## 2024-10-25 RX ORDER — LOSARTAN POTASSIUM AND HYDROCHLOROTHIAZIDE 25; 100 MG/1; MG/1
1 TABLET ORAL DAILY
Qty: 90 TABLET | Refills: 3 | Status: SHIPPED | OUTPATIENT
Start: 2024-10-25

## 2024-10-25 RX ORDER — METFORMIN HYDROCHLORIDE 500 MG/1
TABLET, EXTENDED RELEASE ORAL
Qty: 270 TABLET | Refills: 3 | Status: SHIPPED | OUTPATIENT
Start: 2024-10-25

## 2024-10-25 RX ORDER — PRAVASTATIN SODIUM 20 MG
20 TABLET ORAL DAILY
Qty: 90 TABLET | Refills: 3 | Status: SHIPPED | OUTPATIENT
Start: 2024-10-25

## 2024-10-25 SDOH — HEALTH STABILITY: PHYSICAL HEALTH: ON AVERAGE, HOW MANY DAYS PER WEEK DO YOU ENGAGE IN MODERATE TO STRENUOUS EXERCISE (LIKE A BRISK WALK)?: 0 DAYS

## 2024-10-25 ASSESSMENT — SOCIAL DETERMINANTS OF HEALTH (SDOH): HOW OFTEN DO YOU GET TOGETHER WITH FRIENDS OR RELATIVES?: THREE TIMES A WEEK

## 2024-10-25 NOTE — PROGRESS NOTES
Preventive Care Visit  [facility]  Radames Reed MD, Internal Medicine  Oct 25, 2024       ASSESSMENT:   1. Encounter for routine adult medical exam with abnormal findings  See plan discussion below regarding healthcare maintenance recommendations.  Otherwise up-to-date for age    2. Type 2 diabetes mellitus without complication, without long-term current use of insulin (H)  Controlled.  Continue current medication.  Counseled regarding carbohydrate reduction and increasing exercise.  Repeat A1c in 6 months.  Other diabetic labs in 1 year  - metFORMIN (GLUCOPHAGE XR) 500 MG 24 hr tablet; TAKE 3 TABLETS BY MOUTH EVERY DAY  Dispense: 270 tablet; Refill: 3  - Hemoglobin A1c; Future  - Hemoglobin A1c; Future  - Comprehensive metabolic panel; Future  - Albumin Random Urine Quantitative with Creat Ratio; Future  - FOOT EXAM    3. Essential hypertension, benign  Controlled.  Continue current medication.  Repeat lab 1 year  - amLODIPine (NORVASC) 10 MG tablet; Take 1 tablet (10 mg) by mouth daily.  Dispense: 90 tablet; Refill: 3  - losartan-hydrochlorothiazide (HYZAAR) 100-25 MG tablet; Take 1 tablet by mouth daily.  Dispense: 90 tablet; Refill: 3  - Comprehensive metabolic panel; Future    4. Hyperlipidemia with target LDL less than 100  Controlled.  Continue current medication.  Repeat lab 1 year  - pravastatin (PRAVACHOL) 20 MG tablet; Take 1 tablet (20 mg) by mouth daily.  Dispense: 90 tablet; Refill: 3  - Comprehensive metabolic panel; Future  - Lipid panel reflex to direct LDL Fasting; Future    5. Severe obesity with body mass index (BMI) of 35.0 to 39.9 with serious comorbidity (H)  Weight down 9 pounds but obesity persist.  Contributing to diabetes, hypertension, hyperlipidemia.  See counseling below    6. Visit for screening mammogram  Due for mammogram 11/21/2024 or later.  Asymptomatic  - MA Screening Bilateral w/ Charly; Future    7. Flu vaccine need  - INFLUENZA HIGH DOSE, TRIVALENT, PF  "(FLUZONE)    8. Need for COVID-19 vaccine  - COVID-19 12+ (PFIZER)    9. Screening for osteoporosis  Menopausal.  Due for screening  - DEXA HIP/PELVIS/SPINE - Future; Future      PLAN:  Continue current medications  Prescriptions refilled at your pharmacy.    Call  [phone] or use [employer] to schedule a future lab appointment  non-fasting in 6 months for A1C and fasting labs in 1 year.   For fasting labs, please refrain from eating for 8 hours or more.   Drink 2 glasses of water before your lab appointment. It is fine to take your  oral medications on the morning of the lab test as usual  Schedule a follow up appointment with me in clinic a few days after these future labs are drawn in 1 year to review results and other medical issues as necessary  Because non-acute appointments to see me in clinic are currently booking out about 3 months at the clinic (for multiple reasons), please use [employer] or call the appointment line now to schedule the future appointment so that it is \"on the books\".   Bone density (DEXA)   and Mammogram    11/21/24 or later. This will be done at the [facility]. Call [phone] or use [employer] to schedule.   Hold calcium supplement the day of the DEXA scan  Reduce calorie/carbohydrate (sugar, bread, potato, pasta, rice, alcohol etc)  intake in diet.  Increase color on your plate with vegetables. Increase  frequency of walking or other aerobic exercise as able (goal is daily)  Vaccination given today: Flu vaccine (influenza risk reduction) and Updated covid booster vaccine   Moisturizer for dry skin of the feet such as Vanicream, Lubriderm, Vaseline, Cerave, etc. May get over-the-counter   Healthcare  Directive discussed and given copy.   Patient to complete and return to clinic  Pt was informed regarding extra E&M billing for management of new or established medical issues not related to today's wellness/screening visit           Subjective   Kristen is a 69 year old, " presenting for the following:  Wellness Visit  And follow-up medical issues as above  Patient states she is still working with private insurance and not under Medicare at this time      HPI         Health Care Directive  Patient does not have a Health Care Directive: Discussed advance care planning with patient; information given to patient to review.      10/25/2024   General Health   How would you rate your overall physical health? Good   Feel stress (tense, anxious, or unable to sleep) Not at all            10/25/2024   Nutrition   Diet: Regular (no restrictions)            10/25/2024   Exercise   Days per week of moderate/strenous exercise 0 days      (!) EXERCISE CONCERN      10/25/2024   Social Factors   Frequency of gathering with friends or relatives Three times a week   Worry food won't last until get money to buy more No   Food not last or not have enough money for food? No   Do you have housing? (Housing is defined as stable permanent housing and does not include staying ouside in a car, in a tent, in an abandoned building, in an overnight shelter, or couch-surfing.) Yes   Are you worried about losing your housing? No   Lack of transportation? No   Unable to get utilities (heat,electricity)? No            10/25/2024   Fall Risk   Fallen 2 or more times in the past year? No     No    Trouble with walking or balance? No     No        Patient-reported    Multiple values from one day are sorted in reverse-chronological order          10/25/2024   Activities of Daily Living- Home Safety   Needs help with the following daily activites None of the above   Safety concerns in the home None of the above            10/25/2024   Dental   Dentist two times every year? Yes            10/25/2024   Hearing Screening   Hearing concerns? (!) I NEED TO ASK PEOPLE TO SPEAK UP OR REPEAT THEMSELVES.            10/25/2024   Driving Risk Screening   Patient/family members have concerns about driving No            10/25/2024    General Alertness/Fatigue Screening   Have you been more tired than usual lately? No            10/25/2024   Urinary Incontinence Screening   Bothered by leaking urine in past 6 months No            10/25/2024   TB Screening   Were you born outside of the [place]? No            Today's PHQ-2 Score:       10/25/2024     7:18 AM   PHQ-2 ( 1999 Pfizer)   Q1: Little interest or pleasure in doing things 0    Q2: Feeling down, depressed or hopeless 0    PHQ-2 Score 0    Q1: Little interest or pleasure in doing things Not at all   Q2: Feeling down, depressed or hopeless Not at all   PHQ-2 Score 0       Patient-reported           10/25/2024   Substance Use   Alcohol more than 3/day or more than 7/wk No   Do you have a current opioid prescription? No   How severe/bad is pain from 1 to 10? 0/10 (No Pain)   Do you use any other substances recreationally? No        Social History     Tobacco Use    Smoking status: Never    Smokeless tobacco: Never   Substance Use Topics    Alcohol use: Not Currently     Comment: few times a year    Drug use: No            11/20/2023   LAST FHS-7 RESULTS   1st degree relative breast or ovarian cancer No   Any relative bilateral breast cancer No   Any male have breast cancer No   Any ONE woman have BOTH breast AND ovarian cancer No   Any woman with breast cancer before 50yrs No   2 or more relatives with breast AND/OR ovarian cancer No   2 or more relatives with breast AND/OR bowel cancer No         Mammogram Screening - Mammogram every 1-2 years updated in Health Maintenance based on mutual decision making      History of abnormal Pap smear: No - age 65 or older with adequate negative prior screening test results (3 consecutive negative cytology results, 2 consecutive negative cotesting results, or 2 consecutive negative HrHPV test results within 10 years, with the most recent test occurring within the recommended screening interval for the test used)        6/9/2014    12:00 AM 4/1/2011    12:00  AM 4/13/2009    12:00 AM   PAP / HPV   PAP (Historical) NIL  NIL  NIL      ASCVD Risk   The 10-year ASCVD risk score (Vicky SWENSON, et al., 2019) is: 29.1%    Values used to calculate the score:      Age: 69 years      Sex: Female      Is Non- : No      Diabetic: Yes      Tobacco smoker: No      Systolic Blood Pressure: 155 mmHg      Is BP treated: Yes      HDL Cholesterol: 54 mg/dL      Total Cholesterol: 138 mg/dL     Due for DEXA    Pt's past medical history, family history, habits, medications and allergies were reviewed with the patient today.   Most recent lab results reviewed with pt. Problem list and histories reviewed & adjusted, as indicated.      Current providers sharing in care for this patient include:  Patient Care Team:  Radames Reed MD as PCP - General  Radames Reed MD as Assigned PCP  Buck Armando MD as Assigned Sleep Provider    The following health maintenance items are reviewed in Epic and correct as of today:  Health Maintenance   Topic Date Due    RSV VACCINE (1 - Risk 60-74 years 1-dose series) Never done    DEXA  08/04/2023    ANNUAL REVIEW OF HM ORDERS  08/18/2023    INFLUENZA VACCINE (1) 09/01/2024    COVID-19 Vaccine (6 - 2024-25 season) 09/01/2024    DIABETIC FOOT EXAM  10/15/2024    MEDICARE ANNUAL WELLNESS VISIT  10/13/2024    MAMMO SCREENING  11/20/2024    A1C  04/17/2025    EYE EXAM  10/09/2025    ALT  10/17/2025    BMP  10/17/2025    LIPID  10/17/2025    MICROALBUMIN  10/17/2025    TSH W/FREE T4 REFLEX  10/17/2025    CREATININE  10/17/2025    FALL RISK ASSESSMENT  10/25/2025    COLORECTAL CANCER SCREENING  01/16/2028    ADVANCE CARE PLANNING  10/15/2028    DTAP/TDAP/TD IMMUNIZATION (3 - Td or Tdap) 10/13/2033    HEPATITIS C SCREENING  Completed    PHQ-2 (once per calendar year)  Completed    Pneumococcal Vaccine: 65+ Years  Completed    ZOSTER IMMUNIZATION  Completed    HPV IMMUNIZATION  Aged Out    MENINGITIS IMMUNIZATION  Aged Out    RSV MONOCLONAL  ANTIBODY  Aged Out     Component      Latest Ref Rng 10/13/2023  4:25 PM 10/13/2023  4:41 PM 10/17/2024  8:09 AM 10/17/2024  8:23 AM   Sodium      135 - 145 mmol/L 139   143     Potassium      3.4 - 5.3 mmol/L 4.1   3.8     Carbon Dioxide (CO2)      22 - 29 mmol/L 30 (H)   28     Anion Gap      7 - 15 mmol/L 11   14     Urea Nitrogen      8.0 - 23.0 mg/dL 16.4   12.7     Creatinine      0.51 - 0.95 mg/dL 0.78   0.69     GFR Estimate      >60 mL/min/1.73m2 82   >90     Calcium      8.8 - 10.4 mg/dL 9.8   9.6     Chloride      98 - 107 mmol/L 98   101     Glucose      70 - 99 mg/dL 199 (H)   157 (H)     Alkaline Phosphatase      40 - 150 U/L 63   62     AST      0 - 45 U/L 40   35     ALT      0 - 50 U/L 34   30     Protein Total      6.4 - 8.3 g/dL 7.8   8.0     Albumin      3.5 - 5.2 g/dL 4.4   4.3     Bilirubin Total      <=1.2 mg/dL 0.4   0.5     Patient Fasting?   Yes     Patient Fasting?   Yes     WBC      4.0 - 11.0 10e3/uL   9.4     RBC Count      3.80 - 5.20 10e6/uL   4.75     Hemoglobin      11.7 - 15.7 g/dL   12.6     Hematocrit      35.0 - 47.0 %   38.5     MCV      78 - 100 fL   81     MCH      26.5 - 33.0 pg   26.5     MCHC      31.5 - 36.5 g/dL   32.7     RDW      10.0 - 15.0 %   14.8     Platelet Count      150 - 450 10e3/uL   319     Cholesterol      <200 mg/dL 137   138     Triglycerides      <150 mg/dL 172 (H)   131     HDL Cholesterol      >=50 mg/dL 54   54     LDL Cholesterol Calculated      <100 mg/dL 49   58     Non HDL Cholesterol      <130 mg/dL 83   84     Creatinine Urine      mg/dL  120.0   61.2    Albumin Urine mg/L      mg/L  25.8   <12.0    Albumin Urine mg/g Cr  21.50   --    Estimated Average Glucose      <117 mg/dL   148 (H)     Hemoglobin A1C      0.0 - 5.6 % 6.7 (H)   6.8 (H)     TSH      0.30 - 4.20 uIU/mL   3.15        Legend:  (H) High            Review of Systems  CONSTITUTIONAL: NEGATIVE for fever, chills. Weight down  9 pounds in 1 year  INTEGUMENTARY/SKIN: NEGATIVE for  "worrisome rashes, moles or lesions  EYES: NEGATIVE for vision changes or irritation. Has glasses. Eye exam last month at [place]  ENT/MOUTH: NEGATIVE for ear, mouth and throat problems. Rare sinus drainage with allergis  RESP: NEGATIVE for significant cough or SOB  BREAST: NEGATIVE for masses, tenderness or discharge  CV: NEGATIVE for chest pain, palpitations or peripheral edema  GI: NEGATIVE for nausea, abdominal pain, heartburn, or change in bowel habits  : NEGATIVE for frequency, dysuria, or hematuria. No vaginal sx  MUSCULOSKELETAL: NEGATIVE for significant arthralgias or myalgia  NEURO: NEGATIVE for weakness, dizziness or paresthesias except for rare  at night in  hands depending on sleep position.  No sx now  ENDOCRINE: NEGATIVE for temperature intolerance. On  statin and hx DM. Not checking sugars  HEME: NEGATIVE for bleeding problems  PSYCHIATRIC: NEGATIVE for changes in mood or affect     Objective    Exam  /68   Pulse 59   Temp 97.8  F (36.6  C) (Temporal)   Ht 1.619 m (5' 3.75\")   Wt 97.3 kg (214 lb 6.4 oz)   LMP 10/26/2006   SpO2 96%   BMI 37.09 kg/m     Estimated body mass index is 37.09 kg/m  as calculated from the following:    Height as of this encounter: 1.619 m (5' 3.75\").    Weight as of this encounter: 97.3 kg (214 lb 6.4 oz).    Physical Exam  General appearance   alert, no distress  Skin - No rashes or lesions. Mild dryness skin of feet  Head - normocephalic, atraumatic  Eyes - TAMIR, EOMI, fundi exam with nondilated pupils negative.  Ears - External ears normal. Canals clear. TM's normal.  Nose/Sinuses - Nares normal. Septum midline. Mucosa normal. No drainage or sinus tenderness.  Oropharynx - No erythema, no adenopathy, no exudates.  Neck - Supple without adenopathy or thyromegaly. No bruits.  Lungs - Clear to auscultation without wheezes/rhonchi.  Heart - Regular rate and rhythm without murmurs, clicks, or gallops.  Nodes - No supraclavicular, axillary, or inguinal " adenopathy palpable.  Breasts - deferred  Abdomen - Abdomen obese, soft, non-tender. BS normal. No masses or hepatosplenomegaly palpable. No bruits.  Extremities -No cyanosis, clubbing or edema.   Normal DM foot exam  Musculoskeletal - Spine ROM normal. Muscular strength intact.   Peripheral pulses - radial=4/4, femoral=4/4, posterior tibial=4/4, dorsalis pedis=4/4,  Neuro - Gait normal. Reflexes normal and symmetric. Sensation grossly WNL.  Genital/Rectal - deferred          10/25/2024   Mini Cog   Clock Draw Score 2 Normal   3 Item Recall 3 objects recalled   Mini Cog Total Score 5             Signed Electronically by: Radames Reed MD

## 2024-10-25 NOTE — PATIENT INSTRUCTIONS
"Continue current medications  Prescriptions refilled at your pharmacy.    Call  [phone] or use [employer] to schedule a future lab appointment  non-fasting in 6 months for A1C and fasting labs in 1 year.   For fasting labs, please refrain from eating for 8 hours or more.   Drink 2 glasses of water before your lab appointment. It is fine to take your  oral medications on the morning of the lab test as usual  Schedule a follow up appointment with me in clinic a few days after these future labs are drawn in 1 year to review results and other medical issues as necessary  Because non-acute appointments to see me in clinic are currently booking out about 3 months at the clinic (for multiple reasons), please use [employer] or call the appointment line now to schedule the future appointment so that it is \"on the books\".   Bone density (DEXA)   and Mammogram    11/21/24 or later. This will be done at the [facility]. Call [phone] or use [employer] to schedule.   Hold calcium supplement the day of the DEXA scan  Reduce calorie/carbohydrate (sugar, bread, potato, pasta, rice, alcohol etc)  intake in diet.  Increase color on your plate with vegetables. Increase  frequency of walking or other aerobic exercise as able (goal is daily)  Vaccination given today: Flu vaccine (influenza risk reduction) and Updated covid booster vaccine   Moisturizer for dry skin of the feet such as Vanicream, Lubriderm, Vaseline, Cerave, etc. May get over-the-counter   Healthcare  Directive discussed and given copy.   Patient to complete and return to clinic  Pt was informed regarding extra E&M billing for management of new or established medical issues not related to today's wellness/screening visit      "

## 2024-10-28 ENCOUNTER — PATIENT OUTREACH (OUTPATIENT)
Dept: CARE COORDINATION | Facility: CLINIC | Age: 69
End: 2024-10-28
Payer: COMMERCIAL

## 2024-12-09 ENCOUNTER — ANCILLARY PROCEDURE (OUTPATIENT)
Dept: BONE DENSITY | Facility: CLINIC | Age: 69
End: 2024-12-09
Attending: INTERNAL MEDICINE
Payer: COMMERCIAL

## 2024-12-09 ENCOUNTER — ANCILLARY PROCEDURE (OUTPATIENT)
Dept: MAMMOGRAPHY | Facility: CLINIC | Age: 69
End: 2024-12-09
Attending: INTERNAL MEDICINE
Payer: COMMERCIAL

## 2024-12-09 DIAGNOSIS — Z13.820 SCREENING FOR OSTEOPOROSIS: ICD-10-CM

## 2024-12-09 DIAGNOSIS — Z12.31 VISIT FOR SCREENING MAMMOGRAM: ICD-10-CM

## 2024-12-09 PROCEDURE — 77080 DXA BONE DENSITY AXIAL: CPT | Mod: TC | Performed by: PHYSICIAN ASSISTANT

## 2024-12-09 PROCEDURE — 77067 SCR MAMMO BI INCL CAD: CPT | Mod: TC | Performed by: RADIOLOGY

## 2024-12-09 PROCEDURE — 77063 BREAST TOMOSYNTHESIS BI: CPT | Mod: TC | Performed by: RADIOLOGY

## 2025-05-03 ENCOUNTER — HEALTH MAINTENANCE LETTER (OUTPATIENT)
Age: 70
End: 2025-05-03

## 2025-07-07 ENCOUNTER — TELEPHONE (OUTPATIENT)
Dept: INTERNAL MEDICINE | Facility: CLINIC | Age: 70
End: 2025-07-07
Payer: COMMERCIAL

## 2025-07-07 NOTE — TELEPHONE ENCOUNTER
General Call    Contacts       Contact Date/Time Type Contact Phone/Fax    07/07/2025 12:52 PM CDT Phone (Incoming) Kristen Moser (Self) [phone] (H)          Reason for Call:  Kristen called in and is requesting lab orders to be placed for her annual physical scheduled in November. Pt mentioned that she usually takes her labs one week prior to her physical. Please call pt when lab order is in and she will schedule an appt.         Could we send this information to you in [employer] or would you prefer to receive a phone call?:   No preference   Okay to leave a detailed message?: Yes at Cell number on file:    Telephone Information:   Mobile [phone]

## 2025-07-07 NOTE — TELEPHONE ENCOUNTER
Called and spoke with pt orders for annual labs already placed. Pt will call back to schedule a fasting lab a week before annual visit with pcp in November.